# Patient Record
Sex: FEMALE | Race: WHITE | NOT HISPANIC OR LATINO | Employment: FULL TIME | URBAN - METROPOLITAN AREA
[De-identification: names, ages, dates, MRNs, and addresses within clinical notes are randomized per-mention and may not be internally consistent; named-entity substitution may affect disease eponyms.]

---

## 2017-01-17 ENCOUNTER — OFFICE VISIT (OUTPATIENT)
Dept: FAMILY MEDICINE | Facility: CLINIC | Age: 42
End: 2017-01-17
Payer: COMMERCIAL

## 2017-01-17 VITALS
HEIGHT: 71 IN | OXYGEN SATURATION: 96 % | HEART RATE: 88 BPM | DIASTOLIC BLOOD PRESSURE: 80 MMHG | BODY MASS INDEX: 34.3 KG/M2 | TEMPERATURE: 98.1 F | SYSTOLIC BLOOD PRESSURE: 112 MMHG | RESPIRATION RATE: 16 BRPM | WEIGHT: 245 LBS

## 2017-01-17 DIAGNOSIS — F30.9 BIPOLAR I DISORDER, SINGLE MANIC EPISODE (H): Primary | ICD-10-CM

## 2017-01-17 DIAGNOSIS — R63.5 ABNORMAL WEIGHT GAIN: ICD-10-CM

## 2017-01-17 DIAGNOSIS — R79.89 ELEVATED SERUM CREATININE: ICD-10-CM

## 2017-01-17 DIAGNOSIS — F33.1 MAJOR DEPRESSIVE DISORDER, RECURRENT EPISODE, MODERATE (H): ICD-10-CM

## 2017-01-17 LAB
ERYTHROCYTE [DISTWIDTH] IN BLOOD BY AUTOMATED COUNT: 13.3 % (ref 10–15)
HBA1C MFR BLD: 5.5 % (ref 4.3–6)
HCT VFR BLD AUTO: 40.7 % (ref 35–47)
HGB BLD-MCNC: 13 G/DL (ref 11.7–15.7)
MCH RBC QN AUTO: 30 PG (ref 26.5–33)
MCHC RBC AUTO-ENTMCNC: 31.9 G/DL (ref 31.5–36.5)
MCV RBC AUTO: 94 FL (ref 78–100)
PLATELET # BLD AUTO: 431 10E9/L (ref 150–450)
RBC # BLD AUTO: 4.34 10E12/L (ref 3.8–5.2)
WBC # BLD AUTO: 8.8 10E9/L (ref 4–11)

## 2017-01-17 PROCEDURE — 80053 COMPREHEN METABOLIC PANEL: CPT | Performed by: FAMILY MEDICINE

## 2017-01-17 PROCEDURE — 99214 OFFICE O/P EST MOD 30 MIN: CPT | Performed by: FAMILY MEDICINE

## 2017-01-17 PROCEDURE — 80061 LIPID PANEL: CPT | Performed by: FAMILY MEDICINE

## 2017-01-17 PROCEDURE — 85027 COMPLETE CBC AUTOMATED: CPT | Performed by: FAMILY MEDICINE

## 2017-01-17 PROCEDURE — 36415 COLL VENOUS BLD VENIPUNCTURE: CPT | Performed by: FAMILY MEDICINE

## 2017-01-17 PROCEDURE — 84443 ASSAY THYROID STIM HORMONE: CPT | Performed by: FAMILY MEDICINE

## 2017-01-17 PROCEDURE — 83036 HEMOGLOBIN GLYCOSYLATED A1C: CPT | Performed by: FAMILY MEDICINE

## 2017-01-17 RX ORDER — ZIPRASIDONE HYDROCHLORIDE 40 MG/1
CAPSULE ORAL
Qty: 30 CAPSULE | Refills: 0 | Status: SHIPPED | OUTPATIENT
Start: 2017-01-17

## 2017-01-17 RX ORDER — BUPROPION HYDROCHLORIDE 150 MG/1
300 TABLET ORAL EVERY MORNING
Qty: 60 TABLET | Refills: 0 | Status: SHIPPED | OUTPATIENT
Start: 2017-01-17 | End: 2020-02-02

## 2017-01-17 RX ORDER — VENLAFAXINE HYDROCHLORIDE 225 MG/1
TABLET, EXTENDED RELEASE ORAL
Qty: 30 TABLET | Refills: 0 | Status: SHIPPED | OUTPATIENT
Start: 2017-01-17 | End: 2020-02-02

## 2017-01-17 NOTE — PROGRESS NOTES
SUBJECTIVE:                                                    Jane Seaver is a 41 year old female who presents to clinic today for the following health issues:      Medication Followup of Effexor. BupropionAdriándon    Taking Medication as prescribed: yes    Side Effects:  None    Medication Helping Symptoms:  yes     Sx's stable on current meds- geodon 40mg/d, effexor-ER 225mg/d and wellbutrin XL 300mg/d.  On this regimen about a year.  Wt gain likely geodon...  Overall happy with how it works for her, though would be willing to discuss with psychiatry other options that would not be as tough with wt gain.      Would like to try diet/exercise to work on wt loss first as well.  Just started meal delivery ~1 1/2 months ago.  All vegetarian meals- tasty, some with cheese/carbs, but not much- heavy on vegetables.  Hello Fresh 3d/wk.  2 servings, so gets a left-over lunch.  Helpful with working on getting more balanced, healthy meals.  At baseline, skips meals, doesn't get enough vegetables.    Currently exercising about 1x/wk- volleyball 1x/wk.  Would like to join another team, and do more walking- does more in the summer.  Rec 7-min work-out.    Missed last psychiatry appt as holiday day off changed through work.  Now has appt with Psychiatry- Dr. Annette Naranjo on 1/31/17.  New to her, but recommended by therapist/chain.  Confident she'll be able to make that appt, and just needs 30-day med rx's now.      Patient Active Problem List   Diagnosis     Bipolar I disorder, single manic episode (H)     Severe cervical dysplasia     Major depressive disorder, recurrent episode, moderate (H)     CARDIOVASCULAR SCREENING; LDL GOAL LESS THAN 160     Insomnia     Past Surgical History   Procedure Laterality Date     Cl aff surgical pathology  2006     HANY III     Leep tx, cervical  2006     cone bx removal       Social History   Substance Use Topics     Smoking status: Former Smoker -- 1 years     Quit date: 04/15/2014      Smokeless tobacco: Never Used     Alcohol Use: 0.0 oz/week     0 Standard drinks or equivalent per week      Comment: 1 glass of wine per month     Family History   Problem Relation Age of Onset     C.A.D. Maternal Grandfather 75     Lipids Maternal Grandfather      Hypertension Maternal Grandmother      CEREBROVASCULAR DISEASE Maternal Grandmother      Depression Maternal Grandmother      Lipids Maternal Grandmother      CANCER Father      Lymphoma--leukemia  d. 52     Lipids Father      Allergies Father      GASTROINTESTINAL DISEASE Father      crohns     Gynecology Mother      fibroids, hysterectomy at 48     Lipids Mother      Depression Mother      Depression Paternal Grandmother      Schizophrenia     Other - See Comments Paternal Grandmother      MS     Depression Paternal Grandfather      GASTROINTESTINAL DISEASE Paternal Grandfather      CANCER Paternal Grandfather      throat/esophageal         Current Outpatient Prescriptions   Medication Sig Dispense Refill     ziprasidone (GEODON) 40 MG capsule TAKE 1 CAPSULE(40 MG) BY MOUTH DAILY 30 capsule 0     venlafaxine (EFFEXOR-ER) 225 MG TB24 24 hr tablet TAKE 1 TABLET(225 MG) BY MOUTH DAILY WITH BREAKFAST 30 tablet 0     buPROPion (WELLBUTRIN XL) 150 MG 24 hr tablet Take 2 tablets (300 mg) by mouth every morning 60 tablet 0     MULTIVITAMIN TABS   OR 1 TABLET DAILY       [DISCONTINUED] ziprasidone (GEODON) 40 MG capsule TAKE 1 CAPSULE(40 MG) BY MOUTH DAILY 30 capsule 0     [DISCONTINUED] venlafaxine (EFFEXOR-ER) 225 MG TB24 24 hr tablet TAKE 1 TABLET(225 MG) BY MOUTH DAILY WITH BREAKFAST 30 tablet 0     [DISCONTINUED] buPROPion (WELLBUTRIN XL) 150 MG 24 hr tablet Take 2 tablets (300 mg) by mouth every morning 180 tablet 0     CALCIUM 600 OR 2 TABLETS DAILY       Allergies   Allergen Reactions     Penicillins Hives     Recent Labs   Lab Test  03/20/12   1444  10/14/10   1451  11/04/08   1016   LDL  150*  157*  154*   HDL  48*  47*  54   TRIG   --    --   97  "  TSH   --   1.11   --       BP Readings from Last 3 Encounters:   01/17/17 112/80   11/23/16 126/87   08/19/16 116/80    Wt Readings from Last 3 Encounters:   01/17/17 245 lb (111.131 kg)   11/23/16 246 lb 4.8 oz (111.721 kg)   08/19/16 244 lb (110.678 kg)           Labs reviewed in EPIC  Problem list, Medication list, Allergies, and Medical/Social/Surgical histories reviewed in [facility] and updated as appropriate.    ROS:  Constitutional, HEENT, cardiovascular, pulmonary, gi and gu systems are negative, except as otherwise noted.    OBJECTIVE:                                                    /80 mmHg  Pulse 88  Temp(Src) 98.1  F (36.7  C) (Oral)  Resp 16  Ht 5' 11.25\" (1.81 m)  Wt 245 lb (111.131 kg)  BMI 33.92 kg/m2  SpO2 96%  LMP 01/03/2017  Breastfeeding? No  Body mass index is 33.92 kg/(m^2).   GEN: pleasant, alert, oriented, nad   Psych: full range affect, normal speech and grooming, judgement and insight intact        ASSESSMENT/PLAN:                                                    Depression/Bipolar; recurrent episode-- Partial remission   Associated with the following complications:    Weight gain   Plan:  Labs:  Thyroid, A1C, CMP, lipids and CBC  The following changes are made - diet/exercise discussed - 7-min w/u, cont hello fresh.  Referrals/Other:  Psychiatry f/u appt set up for 1/31/17.      ICD-10-CM    1. Bipolar I disorder, single manic episode (H) F30.9 ziprasidone (GEODON) 40 MG capsule     venlafaxine (EFFEXOR-ER) 225 MG TB24 24 hr tablet     buPROPion (WELLBUTRIN XL) 150 MG 24 hr tablet     CBC with platelets     Lipid Profile with reflex to direct LDL   2. Major depressive disorder, recurrent episode, moderate (H) F33.1 ziprasidone (GEODON) 40 MG capsule     venlafaxine (EFFEXOR-ER) 225 MG TB24 24 hr tablet     buPROPion (WELLBUTRIN XL) 150 MG 24 hr tablet     CBC with platelets   3. Abnormal weight gain R63.5 Hemoglobin A1c     Comprehensive metabolic panel (BMP + Alb, Alk " Phos, ALT, AST, Total. Bili, TP)     Lipid Profile with reflex to direct LDL     TSH with free T4 reflex         Sheree Hampton MD  [facility]

## 2017-01-17 NOTE — MR AVS SNAPSHOT
"              After Visit Summary   1/17/2017    Jane Seaver    MRN: [id number]           Patient Information     Date Of Birth          1975        Visit Information        Provider Department      1/17/2017 10:00 AM Sheree Hampton MD [facility]        Today's Diagnoses     Bipolar I disorder, single manic episode (H)    -  1     Major depressive disorder, recurrent episode, moderate (H)         Abnormal weight gain            Follow-ups after your visit        Who to contact     If you have questions or need follow up information about today's clinic visit or your schedule please contact [facility] directly at [phone].  Normal or non-critical lab and imaging results will be communicated to you by MyChart, letter or phone within 4 business days after the clinic has received the results. If you do not hear from us within 7 days, please contact the clinic through Swoopohart or phone. If you have a critical or abnormal lab result, we will notify you by phone as soon as possible.  Submit refill requests through THE [employer] App or call your pharmacy and they will forward the refill request to us. Please allow 3 business days for your refill to be completed.          Additional Information About Your Visit        MyChart Information     [employer] App gives you secure access to your electronic health record. If you see a primary care provider, you can also send messages to your care team and make appointments. If you have questions, please call your primary care clinic.  If you do not have a primary care provider, please call [phone] and they will assist you.        Care EveryWhere ID     This is your Care EveryWhere ID. This could be used by other organizations to access your [place] medical records  [id number]        Your Vitals Were     Pulse Temperature Respirations    88 98.1  F (36.7  C) (Oral) 16    Height BMI (Body Mass Index) Pulse Oximetry    5' 11.25\" (1.81 m) 33.92 kg/m2 96% "    Last Period Breastfeeding?       01/03/2017 No        Blood Pressure from Last 3 Encounters:   01/17/17 112/80   11/23/16 126/87   08/19/16 116/80    Weight from Last 3 Encounters:   01/17/17 245 lb (111.131 kg)   11/23/16 246 lb 4.8 oz (111.721 kg)   08/19/16 244 lb (110.678 kg)              We Performed the Following     CBC with platelets     Comprehensive metabolic panel (BMP + Alb, Alk Phos, ALT, AST, Total. Bili, TP)     Hemoglobin A1c     Lipid Profile with reflex to direct LDL          Today's Medication Changes          These changes are accurate as of: 1/17/17 10:32 AM.  If you have any questions, ask your nurse or doctor.               These medicines have changed or have updated prescriptions.        Dose/Directions    venlafaxine 225 MG Tb24 24 hr tablet   Commonly known as:  EFFEXOR-ER   This may have changed:  See the new instructions.   Used for:  Bipolar I disorder, single manic episode (H), Major depressive disorder, recurrent episode, moderate (H)   Changed by:  Sheree Hampton MD        TAKE 1 TABLET(225 MG) BY MOUTH DAILY WITH BREAKFAST   Quantity:  30 tablet   Refills:  0       ziprasidone 40 MG capsule   Commonly known as:  GEODON   This may have changed:  See the new instructions.   Used for:  Bipolar I disorder, single manic episode (H), Major depressive disorder, recurrent episode, moderate (H)   Changed by:  Sheree Hampton MD        TAKE 1 CAPSULE(40 MG) BY MOUTH DAILY   Quantity:  30 capsule   Refills:  0            Where to get your medicines      These medications were sent to [facility] [address] [place] [address] AT [address] [place] [id number]     Phone:  [phone]    - buPROPion 150 MG 24 hr tablet  - venlafaxine 225 MG Tb24 24 hr tablet  - ziprasidone 40 MG capsule             Primary Care Provider Office Phone # Fax #    Sheree Hampton [phone]       Anthony  [facility] [address] [facility]  [address]  [facility] [address]        Thank you!     Thank you for choosing [facility]  for your care. Our goal is always to provide you with excellent care. Hearing back from our patients is one way we can continue to improve our services. Please take a few minutes to complete the written survey that you may receive in the mail after your visit with us. Thank you!             Your Updated Medication List - Protect others around you: Learn how to safely use, store and throw away your medicines at www.disposemymeds.org.          This list is accurate as of: 1/17/17 10:32 AM.  Always use your most recent med list.                   Brand Name Dispense Instructions for use    buPROPion 150 MG 24 hr tablet    WELLBUTRIN XL    60 tablet    Take 2 tablets (300 mg) by mouth every morning       CALCIUM 600 PO      2 TABLETS DAILY       MULTIVITAMIN TABS   OR      1 TABLET DAILY       venlafaxine 225 MG Tb24 24 hr tablet    EFFEXOR-ER    30 tablet    TAKE 1 TABLET(225 MG) BY MOUTH DAILY WITH BREAKFAST       ziprasidone 40 MG capsule    GEODON    30 capsule    TAKE 1 CAPSULE(40 MG) BY MOUTH DAILY

## 2017-01-17 NOTE — LETTER
[facility]  [address]  [facility] [address]  [phone]        February 13, 2018    Jane Seaver  [address]  [facility] [address]              Dear Jane Seaver    This is to remind you that your non fasting labs are due.    You may call our office at [phone] to schedule an appointment.    Please disregard this notice if you have already had your labs drawn or made an appointment.        Sincerely,        Sheree Hampton MD

## 2017-01-17 NOTE — NURSING NOTE
"Chief Complaint   Patient presents with     Recheck Medication     Wellbutrin, Bupropion, Geodon       Initial /80 mmHg  Pulse 88  Temp(Src) 98.1  F (36.7  C) (Oral)  Resp 16  Ht 5' 11.25\" (1.81 m)  Wt 245 lb (111.131 kg)  BMI 33.92 kg/m2  SpO2 96%  LMP 01/03/2017  Breastfeeding? No Estimated body mass index is 33.92 kg/(m^2) as calculated from the following:    Height as of this encounter: 5' 11.25\" (1.81 m).    Weight as of this encounter: 245 lb (111.131 kg).  BP completed using cuff size: large  "

## 2017-01-18 LAB
ALBUMIN SERPL-MCNC: 3.5 G/DL (ref 3.4–5)
ALP SERPL-CCNC: 96 U/L (ref 40–150)
ALT SERPL W P-5'-P-CCNC: 27 U/L (ref 0–50)
ANION GAP SERPL CALCULATED.3IONS-SCNC: 7 MMOL/L (ref 3–14)
AST SERPL W P-5'-P-CCNC: 13 U/L (ref 0–45)
BILIRUB SERPL-MCNC: 0.4 MG/DL (ref 0.2–1.3)
BUN SERPL-MCNC: 9 MG/DL (ref 7–30)
CALCIUM SERPL-MCNC: 9 MG/DL (ref 8.5–10.1)
CHLORIDE SERPL-SCNC: 106 MMOL/L (ref 94–109)
CHOLEST SERPL-MCNC: 238 MG/DL
CO2 SERPL-SCNC: 27 MMOL/L (ref 20–32)
CREAT SERPL-MCNC: 1.13 MG/DL (ref 0.52–1.04)
GFR SERPL CREATININE-BSD FRML MDRD: 53 ML/MIN/1.7M2
GLUCOSE SERPL-MCNC: 81 MG/DL (ref 70–99)
HDLC SERPL-MCNC: 50 MG/DL
LDLC SERPL CALC-MCNC: 163 MG/DL
NONHDLC SERPL-MCNC: 188 MG/DL
POTASSIUM SERPL-SCNC: 4 MMOL/L (ref 3.4–5.3)
PROT SERPL-MCNC: 7.6 G/DL (ref 6.8–8.8)
SODIUM SERPL-SCNC: 140 MMOL/L (ref 133–144)
TRIGL SERPL-MCNC: 127 MG/DL
TSH SERPL DL<=0.005 MIU/L-ACNC: 2.21 MU/L (ref 0.4–4)

## 2017-01-18 ASSESSMENT — PATIENT HEALTH QUESTIONNAIRE - PHQ9: SUM OF ALL RESPONSES TO PHQ QUESTIONS 1-9: 7

## 2017-02-06 NOTE — PROGRESS NOTES
Quick Note:    Please send letter below with copy of results (she will likely not get this msg via [employer]).  Thanks! CW    Here are your lab results from your recent visit...  -Your TSH (thyroid stimulating hormone, which is elevated in hypothyroidism, and lowered in hyperthyroidism) is still in the normal range.  -Your CBC labs (which includes blood labs looking for signs of infection or anemia) looks normal as well.  -Your hemoglobin A1C (the three month average of your glucose levels) also looks good at 5.5 (prediabetes range is from 5.7-6.4, diabetes range is >6.4).    -Your cholesterol panel looks abnormal but stable with a high LDL at 163 (the bad cholesterol), but a slightly improved HDL at 50 (the good cholesterol). I would continue your diet/exercise efforts as much as you can, knowing that the psychiatric medications make it hard with weight loss efforts. At this point, I wouldn't start any cholesterol medication. Let me know if you'd like to talk about this in further depth, or if you'd like to meet with a nutritionist/dietician (though it's worth checking with your insurance company to see if would be covered).    -Your CMP (which includes electrolyte levels, blood sugar levels, and kidney and liver function tests) is also one to point out...  Your creatinine is a bit high, and your GFR is lower than it's been. Both are kidney function tests which show some worsening, and it would be good to recheck these labs to see if it is now back to normal, or if this is persistent. I'll put in an order for a future lab, and you can make a lab-only appointment to have it rechecked. I would do this in the next month if you can.      Please let me know if you have any questions.  Best,   Clarence Hampton MD  ______

## 2018-01-16 LAB — PHQ9 SCORE: 6

## 2018-04-23 ENCOUNTER — TRANSFERRED RECORDS (OUTPATIENT)
Dept: HEALTH INFORMATION MANAGEMENT | Facility: CLINIC | Age: 43
End: 2018-04-23

## 2018-11-08 ENCOUNTER — OFFICE VISIT (OUTPATIENT)
Dept: FAMILY MEDICINE | Facility: CLINIC | Age: 43
End: 2018-11-08
Payer: COMMERCIAL

## 2018-11-08 VITALS
TEMPERATURE: 97.8 F | DIASTOLIC BLOOD PRESSURE: 78 MMHG | RESPIRATION RATE: 16 BRPM | WEIGHT: 250 LBS | HEIGHT: 71 IN | SYSTOLIC BLOOD PRESSURE: 124 MMHG | OXYGEN SATURATION: 100 % | HEART RATE: 94 BPM | BODY MASS INDEX: 35 KG/M2

## 2018-11-08 DIAGNOSIS — Z23 NEED FOR PROPHYLACTIC VACCINATION AND INOCULATION AGAINST INFLUENZA: ICD-10-CM

## 2018-11-08 DIAGNOSIS — Z00.01 ENCOUNTER FOR ROUTINE ADULT MEDICAL EXAM WITH ABNORMAL FINDINGS: Primary | ICD-10-CM

## 2018-11-08 DIAGNOSIS — F30.9 BIPOLAR I DISORDER, SINGLE MANIC EPISODE (H): ICD-10-CM

## 2018-11-08 DIAGNOSIS — F33.1 MAJOR DEPRESSIVE DISORDER, RECURRENT EPISODE, MODERATE (H): ICD-10-CM

## 2018-11-08 DIAGNOSIS — Z12.39 SCREENING FOR MALIGNANT NEOPLASM OF BREAST: ICD-10-CM

## 2018-11-08 LAB
BASOPHILS # BLD AUTO: 0 10E9/L (ref 0–0.2)
BASOPHILS NFR BLD AUTO: 0.3 %
DIFFERENTIAL METHOD BLD: ABNORMAL
EOSINOPHIL # BLD AUTO: 0.1 10E9/L (ref 0–0.7)
EOSINOPHIL NFR BLD AUTO: 1.5 %
ERYTHROCYTE [DISTWIDTH] IN BLOOD BY AUTOMATED COUNT: 12.9 % (ref 10–15)
HBA1C MFR BLD: 5.4 % (ref 0–5.6)
HCT VFR BLD AUTO: 41.5 % (ref 35–47)
HGB BLD-MCNC: 13.6 G/DL (ref 11.7–15.7)
LYMPHOCYTES # BLD AUTO: 2 10E9/L (ref 0.8–5.3)
LYMPHOCYTES NFR BLD AUTO: 28.3 %
MCH RBC QN AUTO: 30.5 PG (ref 26.5–33)
MCHC RBC AUTO-ENTMCNC: 32.8 G/DL (ref 31.5–36.5)
MCV RBC AUTO: 93 FL (ref 78–100)
MONOCYTES # BLD AUTO: 0.5 10E9/L (ref 0–1.3)
MONOCYTES NFR BLD AUTO: 6.6 %
NEUTROPHILS # BLD AUTO: 4.5 10E9/L (ref 1.6–8.3)
NEUTROPHILS NFR BLD AUTO: 63.3 %
PLATELET # BLD AUTO: 461 10E9/L (ref 150–450)
RBC # BLD AUTO: 4.46 10E12/L (ref 3.8–5.2)
WBC # BLD AUTO: 7.2 10E9/L (ref 4–11)

## 2018-11-08 PROCEDURE — 93000 ELECTROCARDIOGRAM COMPLETE: CPT | Performed by: PHYSICIAN ASSISTANT

## 2018-11-08 PROCEDURE — 80061 LIPID PANEL: CPT | Performed by: PHYSICIAN ASSISTANT

## 2018-11-08 PROCEDURE — 83036 HEMOGLOBIN GLYCOSYLATED A1C: CPT | Performed by: PHYSICIAN ASSISTANT

## 2018-11-08 PROCEDURE — 90471 IMMUNIZATION ADMIN: CPT | Performed by: PHYSICIAN ASSISTANT

## 2018-11-08 PROCEDURE — 80053 COMPREHEN METABOLIC PANEL: CPT | Performed by: PHYSICIAN ASSISTANT

## 2018-11-08 PROCEDURE — 90686 IIV4 VACC NO PRSV 0.5 ML IM: CPT | Performed by: PHYSICIAN ASSISTANT

## 2018-11-08 PROCEDURE — 85025 COMPLETE CBC W/AUTO DIFF WBC: CPT | Performed by: PHYSICIAN ASSISTANT

## 2018-11-08 PROCEDURE — 36415 COLL VENOUS BLD VENIPUNCTURE: CPT | Performed by: PHYSICIAN ASSISTANT

## 2018-11-08 PROCEDURE — 99396 PREV VISIT EST AGE 40-64: CPT | Mod: 25 | Performed by: PHYSICIAN ASSISTANT

## 2018-11-08 RX ORDER — BUPROPION HYDROCHLORIDE 300 MG/1
300 TABLET ORAL DAILY
Refills: 5 | COMMUNITY
Start: 2018-11-03

## 2018-11-08 RX ORDER — DESVENLAFAXINE 100 MG/1
1 TABLET, EXTENDED RELEASE ORAL EVERY MORNING
Refills: 5 | COMMUNITY
Start: 2018-11-01

## 2018-11-08 ASSESSMENT — ENCOUNTER SYMPTOMS
PSYCHIATRIC NEGATIVE: 1
ENDOCRINE NEGATIVE: 1
MUSCULOSKELETAL NEGATIVE: 1
NEUROLOGICAL NEGATIVE: 1
CONSTITUTIONAL NEGATIVE: 1
GASTROINTESTINAL NEGATIVE: 1
RESPIRATORY NEGATIVE: 1
EYES NEGATIVE: 1
CARDIOVASCULAR NEGATIVE: 1

## 2018-11-08 ASSESSMENT — PATIENT HEALTH QUESTIONNAIRE - PHQ9
10. IF YOU CHECKED OFF ANY PROBLEMS, HOW DIFFICULT HAVE THESE PROBLEMS MADE IT FOR YOU TO DO YOUR WORK, TAKE CARE OF THINGS AT HOME, OR GET ALONG WITH OTHER PEOPLE: SOMEWHAT DIFFICULT
SUM OF ALL RESPONSES TO PHQ QUESTIONS 1-9: 3
SUM OF ALL RESPONSES TO PHQ QUESTIONS 1-9: 3

## 2018-11-08 NOTE — PROGRESS NOTES
Answers for HPI/ROS submitted by the patient on 11/8/2018   Annual Exam:  Frequency of exercise:: 1 day/week  Getting at least 3 servings of Calcium per day:: Yes  Diet:: Regular (no restrictions)  Taking medications regularly:: Yes  Medication side effects:: None, No muscle aches, No significant flushing  Bi-annual eye exam:: NO  Dental care twice a year:: Yes  Sleep apnea or symptoms of sleep apnea:: None  Additional concerns today:: Yes  PHQ-2 Score: 1  Duration of exercise:: 30-45 minutes  If you checked off any problems, how difficult have these problems made it for you to do your work, take care of things at home, or get along with other people?: Somewhat difficult  PHQ9 TOTAL SCORE: 3      Injectable Influenza Immunization Documentation    1.  Is the person to be vaccinated sick today?   No    2. Does the person to be vaccinated have an allergy to a component   of the vaccine?   No  Egg Allergy Algorithm Link    3. Has the person to be vaccinated ever had a serious reaction   to influenza vaccine in the past?   No    4. Has the person to be vaccinated ever had Guillain-Barré syndrome?   No    Form completed by Anupama Bingham CMA

## 2018-11-08 NOTE — PROGRESS NOTES
SUBJECTIVE:   CC: Jane Seaver is an 43 year old woman who presents for preventive health visit.     Physical   Annual:     Getting at least 3 servings of Calcium per day:  Yes    Bi-annual eye exam:  NO    Dental care twice a year:  Yes    Sleep apnea or symptoms of sleep apnea:  None    Diet:  Regular (no restrictions)    Frequency of exercise:  1 day/week    Duration of exercise:  30-45 minutes    Taking medications regularly:  Yes    Medication side effects:  None, No muscle aches and No significant flushing    Additional concerns today:  Yes      Today's PHQ-2 Score:   PHQ-2 ( 1999 Pfizer) 11/8/2018   Q1: Little interest or pleasure in doing things 0   Q2: Feeling down, depressed or hopeless 1   PHQ-2 Score 1   Q1: Little interest or pleasure in doing things Not at all   Q2: Feeling down, depressed or hopeless Several days   PHQ-2 Score 1       Abuse: Current or Past(Physical, Sexual or Emotional)- No  Do you feel safe in your environment - Yes    Social History   Substance Use Topics     Smoking status: Former Smoker     Years: 1.00     Quit date: 4/15/2014     Smokeless tobacco: Never Used     Alcohol use 0.0 oz/week     0 Standard drinks or equivalent per week      Comment: 1 glass of wine per month     Alcohol Use 11/8/2018   If you drink alcohol do you typically have greater than 3 drinks per day OR greater than 7 drinks per week? Not Applicable   No flowsheet data found.    Reviewed orders with patient.  Reviewed health maintenance and updated orders accordingly - Yes  BP Readings from Last 3 Encounters:   11/08/18 124/78   01/17/17 112/80   11/23/16 126/87    Wt Readings from Last 3 Encounters:   11/08/18 250 lb (113.4 kg)   01/17/17 245 lb (111.1 kg)   11/23/16 246 lb 4.8 oz (111.7 kg)                  Patient Active Problem List   Diagnosis     Bipolar I disorder, single manic episode (H)     Severe cervical dysplasia     Major depressive disorder, recurrent episode, moderate (H)     CARDIOVASCULAR  SCREENING; LDL GOAL LESS THAN 160     Insomnia     Past Surgical History:   Procedure Laterality Date     CL AFF SURGICAL PATHOLOGY  2006    HANY III     LEEP TX, CERVICAL  2006    cone bx removal       Social History   Substance Use Topics     Smoking status: Former Smoker     Years: 1.00     Quit date: 4/15/2014     Smokeless tobacco: Never Used     Alcohol use 0.0 oz/week     0 Standard drinks or equivalent per week      Comment: 1 glass of wine per month     Family History   Problem Relation Age of Onset     C.A.D. Maternal Grandfather 75     Lipids Maternal Grandfather      Hypertension Maternal Grandmother      Cerebrovascular Disease Maternal Grandmother      Depression Maternal Grandmother      Lipids Maternal Grandmother      Cancer Father      Lymphoma--leukemia  d. 52     Lipids Father      Allergies Father      GASTROINTESTINAL DISEASE Father      crohns     Gynecology Mother      fibroids, hysterectomy at 48     Lipids Mother      Depression Mother      Depression Paternal Grandmother      Schizophrenia     Other - See Comments Paternal Grandmother      MS     Depression Paternal Grandfather      GASTROINTESTINAL DISEASE Paternal Grandfather      Cancer Paternal Grandfather      throat/esophageal         Current Outpatient Prescriptions   Medication Sig Dispense Refill     buPROPion (WELLBUTRIN XL) 300 MG 24 hr tablet Take 300 mg by mouth daily  5     desvenlafaxine succinate (PRISTIQ) 100 MG 24 hr tablet Take 1 tablet by mouth every morning  5     ziprasidone (GEODON) 40 MG capsule TAKE 1 CAPSULE(40 MG) BY MOUTH DAILY 30 capsule 0     buPROPion (WELLBUTRIN XL) 150 MG 24 hr tablet Take 2 tablets (300 mg) by mouth every morning (Patient not taking: Reported on 11/8/2018) 60 tablet 0     venlafaxine (EFFEXOR-ER) 225 MG TB24 24 hr tablet TAKE 1 TABLET(225 MG) BY MOUTH DAILY WITH BREAKFAST (Patient not taking: Reported on 11/8/2018) 30 tablet 0     Allergies   Allergen Reactions     Penicillins Hives  "      Patient under age 50, mutual decision reflected in health maintenance.      Pertinent mammograms are reviewed under the imaging tab.  History of abnormal Pap smear: YES - updated in Problem List and Health Maintenance accordingly  PAP / HPV Latest Ref Rng & Units 11/23/2016 7/15/2014 3/20/2012   PAP - NIL NIL NIL   HPV 16 DNA NEG Negative - -   HPV 18 DNA NEG Negative - -   OTHER HR HPV NEG Negative - -     Reviewed and updated as needed this visit by clinical staff  Tobacco  Allergies  Meds  Med Hx  Surg Hx  Fam Hx  Soc Hx        Reviewed and updated as needed this visit by Provider            Review of Systems   Constitutional: Negative.    HENT: Negative.    Eyes: Negative.    Respiratory: Negative.    Cardiovascular: Negative.    Gastrointestinal: Negative.    Endocrine: Negative.    Breasts:  negative.    Genitourinary: Negative.    Musculoskeletal: Negative.    Neurological: Negative.    Psychiatric/Behavioral: Negative.           OBJECTIVE:   /78 (Cuff Size: Adult Large)  Pulse 94  Temp 97.8  F (36.6  C) (Tympanic)  Resp 16  Ht 5' 11.25\" (1.81 m)  Wt 250 lb (113.4 kg)  LMP 10/29/2018  SpO2 100%  BMI 34.62 kg/m2  Physical Exam   Constitutional: She is oriented to person, place, and time. She appears well-developed and well-nourished. No distress.   HENT:   Right Ear: Tympanic membrane and external ear normal.   Left Ear: Tympanic membrane and external ear normal.   Nose: Nose normal.   Mouth/Throat: Oropharynx is clear and moist. No oropharyngeal exudate.   Eyes: Conjunctivae are normal. Pupils are equal, round, and reactive to light. Right eye exhibits no discharge. Left eye exhibits no discharge.   Neck: Neck supple. No tracheal deviation present. No thyromegaly present.   Cardiovascular: Normal rate, regular rhythm, S1 normal, S2 normal, normal heart sounds and normal pulses.  Exam reveals no S3, no S4 and no friction rub.    No murmur heard.  Pulmonary/Chest: Effort normal and " "breath sounds normal. No respiratory distress. She has no wheezes. She has no rales. Right breast exhibits no mass, no nipple discharge and no tenderness. Left breast exhibits no mass, no nipple discharge and no tenderness.   Abdominal: Soft. She exhibits no mass. There is no hepatosplenomegaly.   Musculoskeletal: Normal range of motion. She exhibits no edema.   Lymphadenopathy:     She has no cervical adenopathy.   Neurological: She is alert and oriented to person, place, and time. She has normal strength and normal reflexes. She exhibits normal muscle tone.   Skin: Skin is warm and dry. No rash noted.   Psychiatric: She has a normal mood and affect. Judgment and thought content normal. Cognition and memory are normal.         No results found for this or any previous visit (from the past 24 hour(s)).        ASSESSMENT/PLAN:       ICD-10-CM    1. Encounter for routine adult medical exam with abnormal findings Z00.01 EKG 12-lead complete w/read - Clinics     CBC with platelets differential     Comprehensive metabolic panel     Lipid panel reflex to direct LDL Fasting     Hemoglobin A1c     OPTOMETRY REFERRAL   2. Need for prophylactic vaccination and inoculation against influenza Z23    3. Bipolar I disorder, single manic episode (H) F30.9 EKG 12-lead complete w/read - Clinics   4. Screening for malignant neoplasm of breast Z12.31 MA Screening Digital Bilateral   5. Major depressive disorder, recurrent episode, moderate (H) F33.1               COUNSELING:  Reviewed preventive health counseling, as reflected in patient instructions  Special attention given to:        Regular exercise       Healthy diet/nutrition       Vision screening       Immunizations    Vaccinated for: Influenza          BP Readings from Last 1 Encounters:   11/08/18 124/78     Estimated body mass index is 34.62 kg/(m^2) as calculated from the following:    Height as of this encounter: 5' 11.25\" (1.81 m).    Weight as of this encounter: 250 lb " (113.4 kg).      Weight management plan: Discussed healthy diet and exercise guidelines and patient will follow up in 12 months in clinic to re-evaluate.     reports that she quit smoking about 4 years ago. She quit after 1.00 year of use. She has never used smokeless tobacco.      Counseling Resources:  ATP IV Guidelines  Pooled Cohorts Equation Calculator  Breast Cancer Risk Calculator  FRAX Risk Assessment  ICSI Preventive Guidelines  Dietary Guidelines for Americans, 2010  [employer] MyPlate  ASA Prophylaxis  Lung CA Screening    Rena Silva PA-C  [facility]      Answers for HPI/ROS submitted by the patient on 11/8/2018   PHQ-2 Score: 1  If you checked off any problems, how difficult have these problems made it for you to do your work, take care of things at home, or get along with other people?: Somewhat difficult  PHQ9 TOTAL SCORE: 3

## 2018-11-08 NOTE — PATIENT INSTRUCTIONS
Preventive Health Recommendations  Female Ages 40 to 49    Yearly exam:     See your health care provider every year in order to  1. Review health changes.   2. Discuss preventive care.    3. Review your medicines if your doctor prescribed any.      Get a Pap test every three years (unless you have an abnormal result and your provider advises testing more often).      If you get Pap tests with HPV test, you only need to test every 5 years, unless you have an abnormal result. You do not need a Pap test if your uterus was removed (hysterectomy) and you have not had cancer.      You should be tested each year for STDs (sexually transmitted diseases), if you're at risk.     Ask your doctor if you should have a mammogram.      Have a colonoscopy (test for colon cancer) if someone in your family has had colon cancer or polyps before age 50.       Have a cholesterol test every 5 years.       Have a diabetes test (fasting glucose) after age 45. If you are at risk for diabetes, you should have this test every 3 years.    Shots: Get a flu shot each year. Get a tetanus shot every 10 years.     Nutrition:     Eat at least 5 servings of fruits and vegetables each day.    Eat whole-grain bread, whole-wheat pasta and brown rice instead of white grains and rice.    Get adequate Calcium and Vitamin D.      Lifestyle    Exercise at least 150 minutes a week (an average of 30 minutes a day, 5 days a week). This will help you control your weight and prevent disease.    Limit alcohol to one drink per day.    No smoking.     Wear sunscreen to prevent skin cancer.    See your dentist every six months for an exam and cleaning.      Diet and exercise are important!    - Improving your diet and increasing exercise has been shown to improve your overall health.  It is THE BEST preventive health measure.  It is also beneficial to mental health.   - This includes lowering hemoglobin A1C (your diabetes lab test), lowering your blood pressure,  and lowering your cholesterol.  Losing weight often happens with a change in diet and exercise, but even if your weight stays the same - you are still getting benefits from diet and exercise!   - I recommend starting slow - like making a small change in diet or increasing exercise by one time a week to start.    - Then follow up closely to check in.   - A  or dietician are both great resources if that is available to you.     You are due for a mammogram, which is a study to screen for breast cancer.  [place] has several Breast Centers and also a walk-in mammogram service (no appointment needed!) at [facility].  Below is the contact information.  Please complete the mammogram at your earliest convenience.  If you have any questions, call us at [facility] - [phone].    [facility]  [address]  [address]  [place] [address]  Appointments: [phone] or [phone]  Hours: M-F 7:00AM-5:00PM

## 2018-11-09 LAB
ALBUMIN SERPL-MCNC: 3.6 G/DL (ref 3.4–5)
ALP SERPL-CCNC: 85 U/L (ref 40–150)
ALT SERPL W P-5'-P-CCNC: 29 U/L (ref 0–50)
ANION GAP SERPL CALCULATED.3IONS-SCNC: 7 MMOL/L (ref 3–14)
AST SERPL W P-5'-P-CCNC: 18 U/L (ref 0–45)
BILIRUB SERPL-MCNC: 0.4 MG/DL (ref 0.2–1.3)
BUN SERPL-MCNC: 11 MG/DL (ref 7–30)
CALCIUM SERPL-MCNC: 9 MG/DL (ref 8.5–10.1)
CHLORIDE SERPL-SCNC: 104 MMOL/L (ref 94–109)
CHOLEST SERPL-MCNC: 212 MG/DL
CO2 SERPL-SCNC: 27 MMOL/L (ref 20–32)
CREAT SERPL-MCNC: 1.12 MG/DL (ref 0.52–1.04)
GFR SERPL CREATININE-BSD FRML MDRD: 53 ML/MIN/1.7M2
GLUCOSE SERPL-MCNC: 74 MG/DL (ref 70–99)
HDLC SERPL-MCNC: 44 MG/DL
LDLC SERPL CALC-MCNC: 137 MG/DL
NONHDLC SERPL-MCNC: 168 MG/DL
POTASSIUM SERPL-SCNC: 3.7 MMOL/L (ref 3.4–5.3)
PROT SERPL-MCNC: 7.8 G/DL (ref 6.8–8.8)
SODIUM SERPL-SCNC: 138 MMOL/L (ref 133–144)
TRIGL SERPL-MCNC: 157 MG/DL

## 2019-11-06 ENCOUNTER — HEALTH MAINTENANCE LETTER (OUTPATIENT)
Age: 44
End: 2019-11-06

## 2020-02-02 PROBLEM — N18.30 CKD (CHRONIC KIDNEY DISEASE) STAGE 3, GFR 30-59 ML/MIN (H): Status: ACTIVE | Noted: 2020-02-02

## 2020-02-03 ENCOUNTER — OFFICE VISIT (OUTPATIENT)
Dept: FAMILY MEDICINE | Facility: CLINIC | Age: 45
End: 2020-02-03
Payer: COMMERCIAL

## 2020-02-03 VITALS
DIASTOLIC BLOOD PRESSURE: 74 MMHG | HEIGHT: 71 IN | HEART RATE: 81 BPM | SYSTOLIC BLOOD PRESSURE: 110 MMHG | TEMPERATURE: 97.7 F | WEIGHT: 249 LBS | RESPIRATION RATE: 12 BRPM | BODY MASS INDEX: 34.86 KG/M2

## 2020-02-03 DIAGNOSIS — Z00.00 ROUTINE GENERAL MEDICAL EXAMINATION AT A HEALTH CARE FACILITY: ICD-10-CM

## 2020-02-03 DIAGNOSIS — F33.1 MAJOR DEPRESSIVE DISORDER, RECURRENT EPISODE, MODERATE (H): ICD-10-CM

## 2020-02-03 DIAGNOSIS — N18.30 CKD (CHRONIC KIDNEY DISEASE) STAGE 3, GFR 30-59 ML/MIN (H): ICD-10-CM

## 2020-02-03 DIAGNOSIS — Z23 NEED FOR INFLUENZA VACCINATION: ICD-10-CM

## 2020-02-03 DIAGNOSIS — Z00.00 ENCOUNTER FOR ROUTINE ADULT HEALTH EXAMINATION WITHOUT ABNORMAL FINDINGS: Primary | ICD-10-CM

## 2020-02-03 DIAGNOSIS — Z12.31 ENCOUNTER FOR SCREENING MAMMOGRAM FOR BREAST CANCER: ICD-10-CM

## 2020-02-03 DIAGNOSIS — Z12.4 SCREENING FOR CERVICAL CANCER: ICD-10-CM

## 2020-02-03 LAB
CHOLEST SERPL-MCNC: 235 MG/DL
CREAT SERPL-MCNC: 1.08 MG/DL (ref 0.52–1.04)
ERYTHROCYTE [DISTWIDTH] IN BLOOD BY AUTOMATED COUNT: 13.2 % (ref 10–15)
GFR SERPL CREATININE-BSD FRML MDRD: 63 ML/MIN/{1.73_M2}
GLUCOSE SERPL-MCNC: 89 MG/DL (ref 70–99)
HCT VFR BLD AUTO: 41.7 % (ref 35–47)
HDLC SERPL-MCNC: 51 MG/DL
HGB BLD-MCNC: 13.2 G/DL (ref 11.7–15.7)
LDLC SERPL CALC-MCNC: 161 MG/DL
MCH RBC QN AUTO: 29.8 PG (ref 26.5–33)
MCHC RBC AUTO-ENTMCNC: 31.7 G/DL (ref 31.5–36.5)
MCV RBC AUTO: 94 FL (ref 78–100)
NONHDLC SERPL-MCNC: 184 MG/DL
PLATELET # BLD AUTO: 482 10E9/L (ref 150–450)
RBC # BLD AUTO: 4.43 10E12/L (ref 3.8–5.2)
TRIGL SERPL-MCNC: 113 MG/DL
WBC # BLD AUTO: 7.2 10E9/L (ref 4–11)

## 2020-02-03 PROCEDURE — 85027 COMPLETE CBC AUTOMATED: CPT | Performed by: PHYSICIAN ASSISTANT

## 2020-02-03 PROCEDURE — 36415 COLL VENOUS BLD VENIPUNCTURE: CPT | Performed by: PHYSICIAN ASSISTANT

## 2020-02-03 PROCEDURE — 90686 IIV4 VACC NO PRSV 0.5 ML IM: CPT | Performed by: PHYSICIAN ASSISTANT

## 2020-02-03 PROCEDURE — G0145 SCR C/V CYTO,THINLAYER,RESCR: HCPCS | Performed by: PHYSICIAN ASSISTANT

## 2020-02-03 PROCEDURE — 99396 PREV VISIT EST AGE 40-64: CPT | Mod: 25 | Performed by: PHYSICIAN ASSISTANT

## 2020-02-03 PROCEDURE — 90471 IMMUNIZATION ADMIN: CPT | Performed by: PHYSICIAN ASSISTANT

## 2020-02-03 PROCEDURE — 82947 ASSAY GLUCOSE BLOOD QUANT: CPT | Performed by: PHYSICIAN ASSISTANT

## 2020-02-03 PROCEDURE — 80061 LIPID PANEL: CPT | Performed by: PHYSICIAN ASSISTANT

## 2020-02-03 PROCEDURE — 82565 ASSAY OF CREATININE: CPT | Performed by: PHYSICIAN ASSISTANT

## 2020-02-03 PROCEDURE — 87624 HPV HI-RISK TYP POOLED RSLT: CPT | Performed by: PHYSICIAN ASSISTANT

## 2020-02-03 ASSESSMENT — MIFFLIN-ST. JEOR: SCORE: 1885.59

## 2020-02-03 ASSESSMENT — ENCOUNTER SYMPTOMS
EYES NEGATIVE: 1
PSYCHIATRIC NEGATIVE: 1
GASTROINTESTINAL NEGATIVE: 1
RESPIRATORY NEGATIVE: 1
NEUROLOGICAL NEGATIVE: 1
MUSCULOSKELETAL NEGATIVE: 1
CONSTITUTIONAL NEGATIVE: 1
CARDIOVASCULAR NEGATIVE: 1

## 2020-02-03 NOTE — PATIENT INSTRUCTIONS
Preventive Health Recommendations  Female Ages 40 to 49    Yearly exam:     See your health care provider every year in order to  1. Review health changes.   2. Discuss preventive care.    3. Review your medicines if your doctor prescribed any.      Get a Pap test every three years (unless you have an abnormal result and your provider advises testing more often).      If you get Pap tests with HPV test, you only need to test every 5 years, unless you have an abnormal result. You do not need a Pap test if your uterus was removed (hysterectomy) and you have not had cancer.      You should be tested each year for STDs (sexually transmitted diseases), if you're at risk.     Ask your doctor if you should have a mammogram.      Have a colonoscopy (test for colon cancer) if someone in your family has had colon cancer or polyps before age 50.       Have a cholesterol test every 5 years.       Have a diabetes test (fasting glucose) after age 45. If you are at risk for diabetes, you should have this test every 3 years.    Shots: Get a flu shot each year. Get a tetanus shot every 10 years.     Nutrition:     Eat at least 5 servings of fruits and vegetables each day.    Eat whole-grain bread, whole-wheat pasta and brown rice instead of white grains and rice.    Get adequate Calcium and Vitamin D.      Lifestyle    Exercise at least 150 minutes a week (an average of 30 minutes a day, 5 days a week). This will help you control your weight and prevent disease.    Limit alcohol to one drink per day.    No smoking.     Wear sunscreen to prevent skin cancer.    See your dentist every six months for an exam and cleaning.      You are due for a mammogram, which is a study to screen for breast cancer.  [place] has several Breast Centers and also a walk-in mammogram service (no appointment needed!) at our [facility] location.  Below is the contact information.  Please complete the mammogram at your earliest convenience.  If  you have any questions, [facility] at [facility] - [phone].    [facility] walk-in mammogram services:  Address:   [address] [place] [address]  Hours: M/Tu/Th 7:00AM-7:00PM, W/F 7:00AM-5:00PM    [facility]  [address]  [address]  [place] [address]  Appointments: [phone] or [phone]  Hours: M-F 7:00AM-5:00PM    [facility]  [address] [facility]  [address]  [place] [address]  Appointments: [phone] or [phone]

## 2020-02-03 NOTE — PROGRESS NOTES
SUBJECTIVE:   CC: Jane Seaver is an 44 year old woman who presents for preventive health visit.     Healthy Habits:     Getting at least 3 servings of Calcium per day:  Yes    Bi-annual eye exam:  Yes    Dental care twice a year:  Yes    Sleep apnea or symptoms of sleep apnea:  Sleep apnea    Diet:  Vegetarian/vegan    Frequency of exercise:  1 day/week    Duration of exercise:  Less than 15 minutes    Taking medications regularly:  Yes    Medication side effects:  Not applicable, None, No muscle aches and No significant flushing    PHQ-2 Total Score: 2    Additional concerns today:  No      Today's PHQ-2 Score:   PHQ-2 (  [employer]) 2020   Q1: Little interest or pleasure in doing things 1   Q2: Feeling down, depressed or hopeless 1   PHQ-2 Score 2   Q1: Little interest or pleasure in doing things Several days   Q2: Feeling down, depressed or hopeless Several days   PHQ-2 Score 2     PHQ-9 SCORE 2016   PHQ-9 Total Score - - -   PHQ-9 Total Score MyChart - - 3 (Minimal depression)   PHQ-9 Total Score 7 7 3         Abuse: Current or Past(Physical, Sexual or Emotional)- No  Do you feel safe in your environment? Yes        Social History     Tobacco Use     Smoking status: Former Smoker     Years: 1.00     Last attempt to quit: 4/15/2014     Years since quittin.8     Smokeless tobacco: Never Used   Substance Use Topics     Alcohol use: Yes     Alcohol/week: 0.0 standard drinks     Comment: 1 glass of wine per month     If you drink alcohol do you typically have >3 drinks per day or >7 drinks per week? No    Reviewed orders with patient.  Reviewed health maintenance and updated orders accordingly - Yes  BP Readings from Last 3 Encounters:   20 110/74   18 124/78   17 112/80    Wt Readings from Last 3 Encounters:   20 112.9 kg (249 lb)   18 113.4 kg (250 lb)   17 111.1 kg (245 lb)                  Patient Active Problem List   Diagnosis     Bipolar  I disorder, single manic episode (H)     Severe cervical dysplasia     Major depressive disorder, recurrent episode, moderate (H)     CARDIOVASCULAR SCREENING; LDL GOAL LESS THAN 160     Insomnia     CKD (chronic kidney disease) stage 3, GFR 30-59 ml/min (H)     Past Surgical History:   Procedure Laterality Date     CL AFF SURGICAL PATHOLOGY  2006    HANY III     LEEP TX, CERVICAL  2006    cone bx removal       Social History     Tobacco Use     Smoking status: Former Smoker     Years: 1.00     Last attempt to quit: 4/15/2014     Years since quittin.8     Smokeless tobacco: Never Used   Substance Use Topics     Alcohol use: Yes     Alcohol/week: 0.0 standard drinks     Comment: 1 glass of wine per month     Family History   Problem Relation Age of Onset     C.A.D. Maternal Grandfather 75     Lipids Maternal Grandfather      Hypertension Maternal Grandmother      Cerebrovascular Disease Maternal Grandmother      Depression Maternal Grandmother      Lipids Maternal Grandmother      Cancer Father         Lymphoma--leukemia  d. 52     Lipids Father      Allergies Father      Gastrointestinal Disease Father         crohns     Gynecology Mother         fibroids, hysterectomy at 48     Lipids Mother      Depression Mother      Depression Paternal Grandmother         Schizophrenia     Other - See Comments Paternal Grandmother         MS     Depression Paternal Grandfather      Gastrointestinal Disease Paternal Grandfather      Cancer Paternal Grandfather         throat/esophageal         Current Outpatient Medications   Medication Sig Dispense Refill     buPROPion (WELLBUTRIN XL) 300 MG 24 hr tablet Take 300 mg by mouth daily  5     desvenlafaxine succinate (PRISTIQ) 100 MG 24 hr tablet Take 1 tablet by mouth every morning  5     ziprasidone (GEODON) 40 MG capsule TAKE 1 CAPSULE(40 MG) BY MOUTH DAILY 30 capsule 0     Allergies   Allergen Reactions     Penicillins Hives       Mammogram Screening: Patient under age 50,  "mutual decision reflected in health maintenance.      Pertinent mammograms are reviewed under the imaging tab.  History of abnormal Pap smear: YES - updated in Problem List and Health Maintenance accordingly  PAP / HPV Latest Ref Rng & Units 11/23/2016 7/15/2014 3/20/2012   PAP - NIL NIL NIL   HPV 16 DNA NEG Negative - -   HPV 18 DNA NEG Negative - -   OTHER HR HPV NEG Negative - -     Reviewed and updated as needed this visit by clinical staff  Tobacco  Meds  Med Hx  Surg Hx  Fam Hx  Soc Hx        Reviewed and updated as needed this visit by Provider            Review of Systems   Constitutional: Negative.    HENT: Negative.    Eyes: Negative.    Respiratory: Negative.    Cardiovascular: Negative.    Gastrointestinal: Negative.    Genitourinary: Negative.    Musculoskeletal: Negative.    Skin: Negative.    Neurological: Negative.    Psychiatric/Behavioral: Negative.           OBJECTIVE:   /74 (Cuff Size: Adult Large)   Pulse 81   Temp 97.7  F (36.5  C) (Tympanic)   Resp 12   Ht 1.803 m (5' 11\")   Wt 112.9 kg (249 lb)   LMP 01/22/2020   BMI 34.73 kg/m    Physical Exam  Constitutional:       General: She is not in acute distress.     Appearance: She is well-developed.   HENT:      Right Ear: Tympanic membrane and external ear normal.      Left Ear: Tympanic membrane and external ear normal.      Nose: Nose normal.      Mouth/Throat:      Pharynx: No oropharyngeal exudate.   Eyes:      General:         Right eye: No discharge.         Left eye: No discharge.      Conjunctiva/sclera: Conjunctivae normal.      Pupils: Pupils are equal, round, and reactive to light.   Neck:      Musculoskeletal: Neck supple.      Thyroid: No thyromegaly.      Trachea: No tracheal deviation.   Cardiovascular:      Rate and Rhythm: Normal rate and regular rhythm.      Pulses: Normal pulses.      Heart sounds: Normal heart sounds, S1 normal and S2 normal. No murmur. No friction rub. No S3 or S4 sounds.    Pulmonary:      " Effort: Pulmonary effort is normal. No respiratory distress.      Breath sounds: Normal breath sounds. No wheezing or rales.   Chest:      Breasts:         Right: No mass, nipple discharge or tenderness.         Left: No mass, nipple discharge or tenderness.   Abdominal:      Palpations: Abdomen is soft. There is no mass.      Tenderness: There is no abdominal tenderness.   Genitourinary:     Vagina: Normal.      Cervix: No cervical motion tenderness or discharge.   Musculoskeletal: Normal range of motion.   Lymphadenopathy:      Cervical: No cervical adenopathy.   Skin:     General: Skin is warm and dry.      Findings: No rash.   Neurological:      Mental Status: She is alert and oriented to person, place, and time.      Motor: No abnormal muscle tone.      Deep Tendon Reflexes: Reflexes are normal and symmetric.   Psychiatric:         Thought Content: Thought content normal.         Judgment: Judgment normal.           Diagnostic Test Results:  No results found for this or any previous visit (from the past 24 hour(s)).    ASSESSMENT/PLAN:       ICD-10-CM    1. Encounter for routine adult health examination without abnormal findings Z00.00 Lipid panel reflex to direct LDL Fasting     Glucose     Creatinine     CBC with platelets   2. Screening for cervical cancer Z12.4 Pap imaged thin layer screen with HPV - recommended age 30 - 65 years (select HPV order below)     HPV High Risk Types DNA Cervical   3. CKD (chronic kidney disease) stage 3, GFR 30-59 ml/min (H) N18.3 Creatinine   4. Routine general medical examination at a health care facility Z00.00 EKG 12-lead complete w/read - Clinics   5. Major depressive disorder, recurrent episode, moderate (H) F33.1 EKG 12-lead complete w/read - Clinics   6. Need for influenza vaccination Z23 INFLUENZA VACCINE IM > 6 MONTHS VALENT IIV4 [94539]   7. Encounter for screening mammogram for breast cancer Z12.31 MA Screen Bilateral w/Eber      EKG is down today - patient will  "return for the EKG for psychiatry medication monitoring.     Pap completed - refer to Pap RN team for future Pap schedule    Preventive labs and tests ordered as above.     Refills all come from psychiatry.     COUNSELING:  Reviewed preventive health counseling, as reflected in patient instructions    Estimated body mass index is 34.73 kg/m  as calculated from the following:    Height as of this encounter: 1.803 m (5' 11\").    Weight as of this encounter: 112.9 kg (249 lb).    Weight management plan: Discussed healthy diet and exercise guidelines     reports that she quit smoking about 5 years ago. She quit after 1.00 year of use. She has never used smokeless tobacco.      Counseling Resources:  ATP IV Guidelines  Pooled Cohorts Equation Calculator  Breast Cancer Risk Calculator  FRAX Risk Assessment  ICSI Preventive Guidelines  Dietary Guidelines for Americans, 2010  USDA's MyPlate  ASA Prophylaxis  Lung CA Screening    Rena Silva PA-C  [facility]  "

## 2020-02-04 NOTE — RESULT ENCOUNTER NOTE
Jayleen    Your lab tests are complete and I have reviewed the results.     - Your cholesterol is high but the American Heart Association does not recommend starting a medication to lower this at this time.  We will recheck next year.    Lifestyle changes to lower cholesterol:  1. Diet: Below are two links for information on diet changes that have been shown to lower cholesterol.   Check out this link from [facility]:  - [email or web address]   Check out this link from [facility]:  - [email or web address]  2. Exercise:  I recommend increasing exercise by one time a week to start.    The end goal for exercise is 150 minutes of exercise each week!    Diet and exercise are important!  - Improving your diet and increasing exercise has been shown to improve your overall health.  It is THE BEST preventive health measure.  It is also beneficial to mental health.   - A  or dietician are both great resources if that is available to you.       - Your kidney function (Cr, GFR) is normal.  - Your glucose (screening for diabetes) was normal.  - Your Blood Count Results show normal White Blood Cell count (no sign of infection), normal Hemoglobin (not anemia), and a high Platelet count (affects clotting).  Your platelet count has been slightly elevated consistently for many years, I am not concerned.  We will recheck in 6 months.     If you have any questions or concerns, please feel free to call or send a [employer] message.    Sincerely,  Fadi Silva PA-C

## 2020-02-05 LAB
COPATH REPORT: NORMAL
PAP: NORMAL

## 2020-02-06 LAB
FINAL DIAGNOSIS: NORMAL
HPV HR 12 DNA CVX QL NAA+PROBE: NEGATIVE
HPV16 DNA SPEC QL NAA+PROBE: NEGATIVE
HPV18 DNA SPEC QL NAA+PROBE: NEGATIVE
SPECIMEN DESCRIPTION: NORMAL
SPECIMEN SOURCE CVX/VAG CYTO: NORMAL

## 2020-06-24 ENCOUNTER — TRANSFERRED RECORDS (OUTPATIENT)
Dept: HEALTH INFORMATION MANAGEMENT | Facility: CLINIC | Age: 45
End: 2020-06-24

## 2020-06-26 ENCOUNTER — ALLIED HEALTH/NURSE VISIT (OUTPATIENT)
Dept: NURSING | Facility: CLINIC | Age: 45
End: 2020-06-26
Payer: COMMERCIAL

## 2020-06-26 DIAGNOSIS — Z13.6 CARDIOVASCULAR SCREENING; LDL GOAL LESS THAN 160: Primary | ICD-10-CM

## 2020-06-26 DIAGNOSIS — F33.1 MAJOR DEPRESSIVE DISORDER, RECURRENT EPISODE, MODERATE (H): ICD-10-CM

## 2020-06-26 DIAGNOSIS — Z00.00 ROUTINE GENERAL MEDICAL EXAMINATION AT A HEALTH CARE FACILITY: ICD-10-CM

## 2020-06-26 DIAGNOSIS — Z79.899 ENCOUNTER FOR LONG-TERM (CURRENT) USE OF OTHER MEDICATIONS: ICD-10-CM

## 2020-06-26 DIAGNOSIS — F30.9 BIPOLAR I DISORDER, SINGLE MANIC EPISODE (H): Primary | ICD-10-CM

## 2020-06-26 LAB
ANION GAP SERPL CALCULATED.3IONS-SCNC: 6 MMOL/L (ref 3–14)
BUN SERPL-MCNC: 9 MG/DL (ref 7–30)
CALCIUM SERPL-MCNC: 8.8 MG/DL (ref 8.5–10.1)
CHLORIDE SERPL-SCNC: 108 MMOL/L (ref 94–109)
CO2 SERPL-SCNC: 25 MMOL/L (ref 20–32)
CREAT SERPL-MCNC: 1.17 MG/DL (ref 0.52–1.04)
GFR SERPL CREATININE-BSD FRML MDRD: 57 ML/MIN/{1.73_M2}
GLUCOSE SERPL-MCNC: 108 MG/DL (ref 70–99)
POTASSIUM SERPL-SCNC: 4 MMOL/L (ref 3.4–5.3)
SODIUM SERPL-SCNC: 139 MMOL/L (ref 133–144)
T4 FREE SERPL-MCNC: 1 NG/DL (ref 0.76–1.46)
TSH SERPL DL<=0.005 MIU/L-ACNC: 2.58 MU/L (ref 0.4–4)

## 2020-06-26 PROCEDURE — 36415 COLL VENOUS BLD VENIPUNCTURE: CPT | Performed by: PHYSICIAN ASSISTANT

## 2020-06-26 PROCEDURE — 80048 BASIC METABOLIC PNL TOTAL CA: CPT | Performed by: PHYSICIAN ASSISTANT

## 2020-06-26 PROCEDURE — 93000 ELECTROCARDIOGRAM COMPLETE: CPT

## 2020-06-26 PROCEDURE — 84443 ASSAY THYROID STIM HORMONE: CPT | Performed by: PHYSICIAN ASSISTANT

## 2020-06-26 PROCEDURE — 84439 ASSAY OF FREE THYROXINE: CPT | Performed by: PHYSICIAN ASSISTANT

## 2020-10-26 ENCOUNTER — MEDICAL CORRESPONDENCE (OUTPATIENT)
Dept: HEALTH INFORMATION MANAGEMENT | Facility: CLINIC | Age: 45
End: 2020-10-26

## 2020-10-30 DIAGNOSIS — Z79.899 DRUG THERAPY: ICD-10-CM

## 2020-10-30 DIAGNOSIS — F31.81 BIPOLAR II DISORDER (H): Primary | ICD-10-CM

## 2020-11-22 ENCOUNTER — HEALTH MAINTENANCE LETTER (OUTPATIENT)
Age: 45
End: 2020-11-22

## 2021-04-05 ASSESSMENT — PATIENT HEALTH QUESTIONNAIRE - PHQ9
SUM OF ALL RESPONSES TO PHQ QUESTIONS 1-9: 14
10. IF YOU CHECKED OFF ANY PROBLEMS, HOW DIFFICULT HAVE THESE PROBLEMS MADE IT FOR YOU TO DO YOUR WORK, TAKE CARE OF THINGS AT HOME, OR GET ALONG WITH OTHER PEOPLE: VERY DIFFICULT
SUM OF ALL RESPONSES TO PHQ QUESTIONS 1-9: 14

## 2021-04-06 ENCOUNTER — OFFICE VISIT (OUTPATIENT)
Dept: FAMILY MEDICINE | Facility: CLINIC | Age: 46
End: 2021-04-06
Payer: COMMERCIAL

## 2021-04-06 VITALS
HEART RATE: 96 BPM | DIASTOLIC BLOOD PRESSURE: 72 MMHG | TEMPERATURE: 97.1 F | SYSTOLIC BLOOD PRESSURE: 132 MMHG | OXYGEN SATURATION: 97 % | WEIGHT: 241 LBS | HEIGHT: 71 IN | BODY MASS INDEX: 33.74 KG/M2

## 2021-04-06 DIAGNOSIS — Z12.31 ENCOUNTER FOR SCREENING MAMMOGRAM FOR BREAST CANCER: ICD-10-CM

## 2021-04-06 DIAGNOSIS — F33.1 MAJOR DEPRESSIVE DISORDER, RECURRENT EPISODE, MODERATE (H): ICD-10-CM

## 2021-04-06 DIAGNOSIS — Z51.81 ENCOUNTER FOR THERAPEUTIC DRUG MONITORING: ICD-10-CM

## 2021-04-06 DIAGNOSIS — Z78.9 VEGETARIAN DIET: ICD-10-CM

## 2021-04-06 DIAGNOSIS — N18.31 STAGE 3A CHRONIC KIDNEY DISEASE (H): ICD-10-CM

## 2021-04-06 DIAGNOSIS — Z00.00 ENCOUNTER FOR ROUTINE ADULT HEALTH EXAMINATION WITHOUT ABNORMAL FINDINGS: Primary | ICD-10-CM

## 2021-04-06 LAB
ALBUMIN SERPL-MCNC: 3.8 G/DL (ref 3.4–5)
ALP SERPL-CCNC: 75 U/L (ref 40–150)
ALT SERPL W P-5'-P-CCNC: 24 U/L (ref 0–50)
ANION GAP SERPL CALCULATED.3IONS-SCNC: 4 MMOL/L (ref 3–14)
AST SERPL W P-5'-P-CCNC: 13 U/L (ref 0–45)
BILIRUB SERPL-MCNC: 0.3 MG/DL (ref 0.2–1.3)
BUN SERPL-MCNC: 14 MG/DL (ref 7–30)
CALCIUM SERPL-MCNC: 9.1 MG/DL (ref 8.5–10.1)
CHLORIDE SERPL-SCNC: 107 MMOL/L (ref 94–109)
CHOLEST SERPL-MCNC: 243 MG/DL
CO2 SERPL-SCNC: 24 MMOL/L (ref 20–32)
CREAT SERPL-MCNC: 1.14 MG/DL (ref 0.52–1.04)
GFR SERPL CREATININE-BSD FRML MDRD: 58 ML/MIN/{1.73_M2}
GLUCOSE SERPL-MCNC: 86 MG/DL (ref 70–99)
HBA1C MFR BLD: 5.3 % (ref 0–5.6)
HDLC SERPL-MCNC: 51 MG/DL
LDLC SERPL CALC-MCNC: 161 MG/DL
NONHDLC SERPL-MCNC: 192 MG/DL
POTASSIUM SERPL-SCNC: 4.4 MMOL/L (ref 3.4–5.3)
PROT SERPL-MCNC: 7.9 G/DL (ref 6.8–8.8)
SODIUM SERPL-SCNC: 135 MMOL/L (ref 133–144)
TRIGL SERPL-MCNC: 153 MG/DL
TSH SERPL DL<=0.005 MIU/L-ACNC: 2.31 MU/L (ref 0.4–4)

## 2021-04-06 PROCEDURE — 84443 ASSAY THYROID STIM HORMONE: CPT | Performed by: PHYSICIAN ASSISTANT

## 2021-04-06 PROCEDURE — 93000 ELECTROCARDIOGRAM COMPLETE: CPT | Performed by: PHYSICIAN ASSISTANT

## 2021-04-06 PROCEDURE — 80053 COMPREHEN METABOLIC PANEL: CPT | Performed by: PHYSICIAN ASSISTANT

## 2021-04-06 PROCEDURE — 83921 ORGANIC ACID SINGLE QUANT: CPT | Performed by: PHYSICIAN ASSISTANT

## 2021-04-06 PROCEDURE — 99396 PREV VISIT EST AGE 40-64: CPT | Performed by: PHYSICIAN ASSISTANT

## 2021-04-06 PROCEDURE — 83036 HEMOGLOBIN GLYCOSYLATED A1C: CPT | Performed by: PHYSICIAN ASSISTANT

## 2021-04-06 PROCEDURE — 80061 LIPID PANEL: CPT | Performed by: PHYSICIAN ASSISTANT

## 2021-04-06 PROCEDURE — 86803 HEPATITIS C AB TEST: CPT | Performed by: PHYSICIAN ASSISTANT

## 2021-04-06 PROCEDURE — 36415 COLL VENOUS BLD VENIPUNCTURE: CPT | Performed by: PHYSICIAN ASSISTANT

## 2021-04-06 RX ORDER — DESVENLAFAXINE 50 MG/1
TABLET, EXTENDED RELEASE ORAL
COMMUNITY
Start: 2020-10-26 | End: 2021-04-06

## 2021-04-06 ASSESSMENT — MIFFLIN-ST. JEOR: SCORE: 1844.3

## 2021-04-06 ASSESSMENT — ENCOUNTER SYMPTOMS
EYES NEGATIVE: 1
NEUROLOGICAL NEGATIVE: 1
GASTROINTESTINAL NEGATIVE: 1
CONSTITUTIONAL NEGATIVE: 1
RESPIRATORY NEGATIVE: 1
CARDIOVASCULAR NEGATIVE: 1
MUSCULOSKELETAL NEGATIVE: 1

## 2021-04-06 ASSESSMENT — PATIENT HEALTH QUESTIONNAIRE - PHQ9: SUM OF ALL RESPONSES TO PHQ QUESTIONS 1-9: 14

## 2021-04-06 NOTE — PATIENT INSTRUCTIONS
While the state has opened eligibility to all people, our health system will continue to prioritize those groups who are most at risk of exposure to COVID-19 and/or hospitalization due to COVID-19. Once a larger percentage of these groups are vaccinated, we will open vaccine appointments to a larger group.      We are working hard to begin vaccinating more people against COVID-19. Beginning Wednesday, March 31, we are vaccinating:     Individuals age 50 and older.    All healthcare workers, both paid and unpaid.     People age 16 or 18-49 years with certain medical conditions or disabilities listed in Phase 1b tier 4.    People who identify as one or more of the following high-risk groups: Black/, /[place] Native, Southeast , /, and /.     If you fit into one of these categories, please log in to [facility] using this link to see if we have an open appointment and schedule an appointment.      If there are no appointments left, you will be unable to schedule.   If you have technical difficulty using [employer], call [phone] for assistance.      As vaccine supply increases and we are able to open appointments to more groups, we will share that information on our website:  [email or web address]. Check this website to stay up to date on COVID-19 vaccination information.              You are due for a mammogram, which is a study to screen for breast cancer.  [place] has several Breast Centers and also a walk-in mammogram service (no appointment needed!) at our [facility] location.  Below is the contact information.  Please complete the mammogram at your earliest convenience.    [facility] walk-in mammogram services:  Address:   [address] [place] [id number]  Hours: M/Tu/Th 7:00AM-7:00PM, W/F 7:00AM-5:00PM    [facility] [place] [address]  [address]  [place] [address]  Appointments: [phone] or [phone]  Hours: M-F 7:00AM-5:00PM    [facility]  [address]  [address]  [place] [address]  Appointments: [phone] or [phone]

## 2021-04-06 NOTE — PROGRESS NOTES
SUBJECTIVE:   CC: Jane E Seaver is an 45 year old woman who presents for preventive health visit.     Patient has been advised of split billing requirements and indicates understanding: Yes  Healthy Habits:     Getting at least 3 servings of Calcium per day:  Yes    Bi-annual eye exam:  Yes    Dental care twice a year:  Yes    Sleep apnea or symptoms of sleep apnea:  Sleep apnea    Diet:  Vegetarian/vegan    Duration of exercise:  Less than 15 minutes    Taking medications regularly:  Yes    Medication side effects:  None, No muscle aches and No significant flushing    PHQ-2 Total Score: 4    Additional concerns today:  Yes     Answers for HPI/ROS submitted by the patient on 4/5/2021   Annual Exam:  If you checked off any problems, how difficult have these problems made it for you to do your work, take care of things at home, or get along with other people?: Very difficult  PHQ9 TOTAL SCORE: 14        Today's PHQ-2 Score:   PHQ-2 ( 1999 [employer]) 4/5/2021   Q1: Little interest or pleasure in doing things 2   Q2: Feeling down, depressed or hopeless 2   PHQ-2 Score 4   Q1: Little interest or pleasure in doing things More than half the days   Q2: Feeling down, depressed or hopeless More than half the days   PHQ-2 Score 4     PHQ 1/17/2017 11/8/2018 4/5/2021   PHQ-9 Total Score 7 3 14   Q9: Thoughts of better off dead/self-harm past 2 weeks Not at all Not at all Not at all     No flowsheet data found.      Abuse: Current or Past (Physical, Sexual or Emotional) - No  Do you feel safe in your environment? Yes    Have you ever done Advance Care Planning? (For example, a Health Directive, POLST, or a discussion with a medical provider or your loved ones about your wishes): No, advance care planning information given to patient to review.  Patient declined advance care planning discussion at this time.    Social History     Tobacco Use     Smoking status: Former Smoker     Packs/day: 0.00     Years: 5.00     Pack years:  0.00     Types: Other     Start date: 2007     Quit date: 4/15/2014     Years since quittin.9     Smokeless tobacco: Never Used   Substance Use Topics     Alcohol use: Not Currently     Alcohol/week: 0.0 standard drinks     Comment: 1 glass of wine per month     If you drink alcohol do you typically have >3 drinks per day or >7 drinks per week? No    Alcohol Use 2021   Prescreen: >3 drinks/day or >7 drinks/week? -   Prescreen: >3 drinks/day or >7 drinks/week? No       Reviewed orders with patient.  Reviewed health maintenance and updated orders accordingly - Yes  Patient Active Problem List   Diagnosis     Bipolar I disorder, single manic episode (H)     Severe cervical dysplasia     Major depressive disorder, recurrent episode, moderate (H)     CARDIOVASCULAR SCREENING; LDL GOAL LESS THAN 160     Insomnia     CKD (chronic kidney disease) stage 3, GFR 30-59 ml/min     Past Surgical History:   Procedure Laterality Date     CL AFF SURGICAL PATHOLOGY  2006    HANY III     LEEP TX, CERVICAL  2006    cone bx removal       Social History     Tobacco Use     Smoking status: Former Smoker     Packs/day: 0.00     Years: 5.00     Pack years: 0.00     Types: Other     Start date: 2007     Quit date: 4/15/2014     Years since quittin.9     Smokeless tobacco: Never Used   Substance Use Topics     Alcohol use: Not Currently     Alcohol/week: 0.0 standard drinks     Comment: 1 glass of wine per month     Family History   Problem Relation Age of Onset     C.A.D. Maternal Grandfather 75     Lipids Maternal Grandfather      Hypertension Maternal Grandmother      Cerebrovascular Disease Maternal Grandmother      Depression Maternal Grandmother      Lipids Maternal Grandmother      Cancer Father         Lymphoma--leukemia  d. 52     Lipids Father      Allergies Father      Gastrointestinal Disease Father         crohns     Hyperlipidemia Father      Other Cancer Father      Gynecology Mother         fibroids,  hysterectomy at 48     Lipids Mother      Depression Mother      Hyperlipidemia Mother      Depression Paternal Grandmother         Schizophrenia     Other - See Comments Paternal Grandmother         MS     Mental Illness Paternal Grandmother      Depression Paternal Grandfather      Gastrointestinal Disease Paternal Grandfather      Cancer Paternal Grandfather         throat/esophageal     Other Cancer Paternal Grandfather      Other Cancer Other         Maternal Aunt     Depression Other         Maternal Aunt     Substance Abuse Other      Mental Illness Other         Paternal Uncle     Mental Illness Other         Paternal Uncle         Current Outpatient Medications   Medication Sig Dispense Refill     buPROPion (WELLBUTRIN XL) 300 MG 24 hr tablet Take 300 mg by mouth daily  5     desvenlafaxine succinate (PRISTIQ) 100 MG 24 hr tablet Take 1 tablet by mouth every morning  5     ziprasidone (GEODON) 40 MG capsule TAKE 1 CAPSULE(40 MG) BY MOUTH DAILY 30 capsule 0     Allergies   Allergen Reactions     Penicillins Hives       Breast Cancer Screening:    Breast CA Risk Assessment (FHS-7) 4/5/2021   Do you have a family history of breast, colon, or ovarian cancer? No / Unknown         Mammogram Screening - Offered annual screening and updated Health Maintenance for mutual plan based on risk factor consideration    Pertinent mammograms are reviewed under the imaging tab.    History of abnormal Pap smear: YES - updated in Problem List and Health Maintenance accordingly  PAP / HPV Latest Ref Rng & Units 2/3/2020 11/23/2016 7/15/2014   PAP - NIL NIL NIL   HPV 16 DNA NEG:Negative Negative Negative -   HPV 18 DNA NEG:Negative Negative Negative -   OTHER HR HPV NEG:Negative Negative Negative -     Reviewed and updated as needed this visit by clinical staff  Tobacco  Allergies  Meds              Reviewed and updated as needed this visit by Provider                    Review of Systems   Constitutional: Negative.    HENT:  "Negative.    Eyes: Negative.    Respiratory: Negative.    Cardiovascular: Negative.    Gastrointestinal: Negative.    Genitourinary: Negative.    Musculoskeletal: Negative.    Skin: Negative.    Neurological: Negative.    Psychiatric/Behavioral:        As in HPI          OBJECTIVE:   /72 (BP Location: Left arm, Cuff Size: Adult Large)   Pulse 96   Temp 97.1  F (36.2  C) (Tympanic)   Ht 1.803 m (5' 11\")   Wt 109.3 kg (241 lb)   SpO2 97%   BMI 33.61 kg/m    Physical Exam  Constitutional:       General: She is not in acute distress.     Appearance: She is well-developed.   HENT:      Right Ear: Tympanic membrane and external ear normal.      Left Ear: Tympanic membrane and external ear normal.      Nose: Nose normal.      Mouth/Throat:      Pharynx: No oropharyngeal exudate.   Eyes:      General:         Right eye: No discharge.         Left eye: No discharge.      Conjunctiva/sclera: Conjunctivae normal.      Pupils: Pupils are equal, round, and reactive to light.   Neck:      Musculoskeletal: Neck supple.      Thyroid: No thyromegaly.      Trachea: No tracheal deviation.   Cardiovascular:      Rate and Rhythm: Normal rate and regular rhythm.      Pulses: Normal pulses.      Heart sounds: Normal heart sounds, S1 normal and S2 normal. No murmur. No friction rub. No S3 or S4 sounds.    Pulmonary:      Effort: Pulmonary effort is normal. No respiratory distress.      Breath sounds: Normal breath sounds. No wheezing or rales.   Chest:      Breasts:         Right: No mass, nipple discharge or tenderness.         Left: No mass, nipple discharge or tenderness.   Abdominal:      Palpations: Abdomen is soft. There is no mass.      Tenderness: There is no abdominal tenderness.   Musculoskeletal: Normal range of motion.   Lymphadenopathy:      Cervical: No cervical adenopathy.   Skin:     General: Skin is warm and dry.      Findings: No rash.   Neurological:      Mental Status: She is alert and oriented to person, " "place, and time.      Motor: No abnormal muscle tone.   Psychiatric:         Thought Content: Thought content normal.         Judgment: Judgment normal.           Diagnostic Test Results:  No results found for this or any previous visit (from the past 24 hour(s)).  EKG - normal sinus rhythm, normal QT intervall    ASSESSMENT/PLAN:       ICD-10-CM    1. Encounter for routine adult health examination without abnormal findings  Z00.00 Hepatitis C Screen Reflex to HCV RNA Quant and Genotype     Lipid panel reflex to direct LDL Fasting     Hemoglobin A1c     Comprehensive metabolic panel (BMP + Alb, Alk Phos, ALT, AST, Total. Bili, TP)     TSH with free T4 reflex   2. Major depressive disorder, recurrent episode, moderate (H)  F33.1    3. Stage 3a chronic kidney disease  N18.31    4. Encounter for screening mammogram for breast cancer  Z12.31 MA Screen Bilateral w/Eber   5. Vegetarian diet  Z78.9 Methylmalonic Acid   6. Encounter for therapeutic drug monitoring  Z51.81 EKG 12-lead complete w/read - Clinics      MDD - managed by psychiatry, goes to CBT, COVID has made this more difficult  Labs as above for prev care.       Patient has been advised of split billing requirements and indicates understanding: Yes  COUNSELING:  Reviewed preventive health counseling, as reflected in patient instructions    Estimated body mass index is 33.61 kg/m  as calculated from the following:    Height as of this encounter: 1.803 m (5' 11\").    Weight as of this encounter: 109.3 kg (241 lb).    Weight management plan: Discussed healthy diet and exercise guidelines    She reports that she quit smoking about 6 years ago. Her smoking use included other. She started smoking about 14 years ago. She smoked 0.00 packs per day for 5.00 years. She has never used smokeless tobacco.      Counseling Resources:  ATP IV Guidelines  Pooled Cohorts Equation Calculator  Breast Cancer Risk Calculator  BRCA-Related Cancer Risk Assessment: FHS-7 Tool  FRAX " Risk Assessment  ICSI Preventive Guidelines  Dietary Guidelines for Americans, 2010  USDA's MyPlate  ASA Prophylaxis  Lung CA Screening    BRIAN Tipton [facility]

## 2021-04-07 LAB — HCV AB SERPL QL IA: NONREACTIVE

## 2021-04-08 ENCOUNTER — IMMUNIZATION (OUTPATIENT)
Dept: NURSING | Facility: CLINIC | Age: 46
End: 2021-04-08
Payer: COMMERCIAL

## 2021-04-08 DIAGNOSIS — Z79.899 HIGH RISK MEDICATION USE: ICD-10-CM

## 2021-04-08 DIAGNOSIS — F31.9 BIPOLAR DISORDER (H): Primary | ICD-10-CM

## 2021-04-08 LAB — METHYLMALONATE SERPL-SCNC: 0.44 UMOL/L (ref 0–0.4)

## 2021-04-08 PROCEDURE — 0001A PR COVID VAC PFIZER DIL RECON 30 MCG/0.3 ML IM: CPT

## 2021-04-08 PROCEDURE — 91300 PR COVID VAC PFIZER DIL RECON 30 MCG/0.3 ML IM: CPT

## 2021-04-08 NOTE — RESULT ENCOUNTER NOTE
Jayleen    Your lab tests are complete and I have reviewed the results.     - Your cholesterol is high but the American Heart Association does not recommend starting a medication to lower this at this time.  We will recheck next year.    Lifestyle changes to lower cholesterol:  1. Diet: Eating a Mediterranean diet can help lower cholesterol.   Check out this link from [facility]:  - [email or web address]  2. Exercise:  I recommend increasing exercise by one time a week to start.    The end goal for exercise is 150 minutes of exercise each week!    Diet and exercise are important!  - Improving your diet and increasing exercise has been shown to improve your overall health.  It is THE BEST preventive health measure.  It is also beneficial to mental health.   - A  or dietician are both great resources if that is available to you.       - Your metabolic panel shows:  normal electrolytes (sodium, potassium, calcium) decreased but stable kidney function (creatinine and GFR) normal liver function (AST/ALT)  - Your A1c is normal.  - Your TSH, a screening test for thyroid disease, was normal.  - Your B12 levels are slightly low - I recommend you supplement vitamin B12: please take 1000 mcg once a day.  This is over the counter.   - Your Hepatitis C Screening test was negative for infection.    If you have any questions or concerns, please feel free to call or send a [employer] message.    Sincerely,  Fadi Silva PA-C

## 2021-04-26 ENCOUNTER — TRANSFERRED RECORDS (OUTPATIENT)
Dept: HEALTH INFORMATION MANAGEMENT | Facility: CLINIC | Age: 46
End: 2021-04-26

## 2021-04-29 ENCOUNTER — IMMUNIZATION (OUTPATIENT)
Dept: NURSING | Facility: CLINIC | Age: 46
End: 2021-04-29
Attending: FAMILY MEDICINE
Payer: COMMERCIAL

## 2021-04-29 PROCEDURE — 91300 PR COVID VAC PFIZER DIL RECON 30 MCG/0.3 ML IM: CPT

## 2021-04-29 PROCEDURE — 0002A PR COVID VAC PFIZER DIL RECON 30 MCG/0.3 ML IM: CPT

## 2021-05-25 ENCOUNTER — TELEPHONE (OUTPATIENT)
Dept: FAMILY MEDICINE | Facility: CLINIC | Age: 46
End: 2021-05-25

## 2021-05-25 NOTE — TELEPHONE ENCOUNTER
Received consent for ROBYN from [employer] faxed over requested information to [phone] and copy of ROBYN given to  to scan in.   Elizabeth Jhonson

## 2021-09-19 ENCOUNTER — HEALTH MAINTENANCE LETTER (OUTPATIENT)
Age: 46
End: 2021-09-19

## 2022-02-14 ENCOUNTER — IMMUNIZATION (OUTPATIENT)
Dept: NURSING | Facility: CLINIC | Age: 47
End: 2022-02-14
Payer: COMMERCIAL

## 2022-02-14 PROCEDURE — 91305 COVID-19,PF,PFIZER (12+ YRS): CPT

## 2022-02-14 PROCEDURE — 0054A COVID-19,PF,PFIZER (12+ YRS): CPT

## 2022-02-19 ASSESSMENT — ENCOUNTER SYMPTOMS
COUGH: 0
NERVOUS/ANXIOUS: 1
SORE THROAT: 0
PALPITATIONS: 0
HEADACHES: 0
CONSTIPATION: 0
MYALGIAS: 0
EYE PAIN: 0
CHILLS: 0
DYSURIA: 0
PARESTHESIAS: 0
WEAKNESS: 0
DIARRHEA: 0
HEMATURIA: 0
FREQUENCY: 0
HEMATOCHEZIA: 1
BREAST MASS: 0
HEARTBURN: 0
NAUSEA: 0
SHORTNESS OF BREATH: 0
FEVER: 0
JOINT SWELLING: 0
DIZZINESS: 0
ABDOMINAL PAIN: 0
ARTHRALGIAS: 1

## 2022-02-22 ENCOUNTER — OFFICE VISIT (OUTPATIENT)
Dept: FAMILY MEDICINE | Facility: CLINIC | Age: 47
End: 2022-02-22
Payer: COMMERCIAL

## 2022-02-22 VITALS
RESPIRATION RATE: 12 BRPM | SYSTOLIC BLOOD PRESSURE: 126 MMHG | OXYGEN SATURATION: 96 % | HEART RATE: 93 BPM | TEMPERATURE: 98 F | BODY MASS INDEX: 28.53 KG/M2 | HEIGHT: 71 IN | WEIGHT: 203.8 LBS | DIASTOLIC BLOOD PRESSURE: 76 MMHG

## 2022-02-22 DIAGNOSIS — F31.70 BIPOLAR DISORDER IN PARTIAL REMISSION, MOST RECENT EPISODE UNSPECIFIED TYPE (H): ICD-10-CM

## 2022-02-22 DIAGNOSIS — Z79.899 HIGH RISK MEDICATION USE: ICD-10-CM

## 2022-02-22 DIAGNOSIS — Z12.11 SPECIAL SCREENING FOR MALIGNANT NEOPLASMS, COLON: ICD-10-CM

## 2022-02-22 DIAGNOSIS — F33.1 MAJOR DEPRESSIVE DISORDER, RECURRENT EPISODE, MODERATE (H): ICD-10-CM

## 2022-02-22 DIAGNOSIS — N18.31 STAGE 3A CHRONIC KIDNEY DISEASE (H): ICD-10-CM

## 2022-02-22 DIAGNOSIS — Z00.00 ENCOUNTER FOR ROUTINE ADULT HEALTH EXAMINATION WITHOUT ABNORMAL FINDINGS: Primary | ICD-10-CM

## 2022-02-22 DIAGNOSIS — E53.8 VITAMIN B12 DEFICIENCY (NON ANEMIC): ICD-10-CM

## 2022-02-22 DIAGNOSIS — Z23 NEED FOR IMMUNIZATION AGAINST INFLUENZA: ICD-10-CM

## 2022-02-22 LAB
BASOPHILS # BLD AUTO: 0 10E3/UL (ref 0–0.2)
BASOPHILS NFR BLD AUTO: 0 %
EOSINOPHIL # BLD AUTO: 0.1 10E3/UL (ref 0–0.7)
EOSINOPHIL NFR BLD AUTO: 3 %
ERYTHROCYTE [DISTWIDTH] IN BLOOD BY AUTOMATED COUNT: 12.6 % (ref 10–15)
HBA1C MFR BLD: 5.3 % (ref 0–5.6)
HCT VFR BLD AUTO: 40 % (ref 35–47)
HGB BLD-MCNC: 13.1 G/DL (ref 11.7–15.7)
LYMPHOCYTES # BLD AUTO: 1.5 10E3/UL (ref 0.8–5.3)
LYMPHOCYTES NFR BLD AUTO: 29 %
MCH RBC QN AUTO: 31.4 PG (ref 26.5–33)
MCHC RBC AUTO-ENTMCNC: 32.8 G/DL (ref 31.5–36.5)
MCV RBC AUTO: 96 FL (ref 78–100)
MONOCYTES # BLD AUTO: 0.4 10E3/UL (ref 0–1.3)
MONOCYTES NFR BLD AUTO: 7 %
NEUTROPHILS # BLD AUTO: 3.1 10E3/UL (ref 1.6–8.3)
NEUTROPHILS NFR BLD AUTO: 61 %
PLATELET # BLD AUTO: 398 10E3/UL (ref 150–450)
RBC # BLD AUTO: 4.17 10E6/UL (ref 3.8–5.2)
WBC # BLD AUTO: 5.1 10E3/UL (ref 4–11)

## 2022-02-22 PROCEDURE — 82043 UR ALBUMIN QUANTITATIVE: CPT | Performed by: PHYSICIAN ASSISTANT

## 2022-02-22 PROCEDURE — 90686 IIV4 VACC NO PRSV 0.5 ML IM: CPT | Performed by: PHYSICIAN ASSISTANT

## 2022-02-22 PROCEDURE — 90471 IMMUNIZATION ADMIN: CPT | Performed by: PHYSICIAN ASSISTANT

## 2022-02-22 PROCEDURE — 99396 PREV VISIT EST AGE 40-64: CPT | Mod: 25 | Performed by: PHYSICIAN ASSISTANT

## 2022-02-22 PROCEDURE — 93000 ELECTROCARDIOGRAM COMPLETE: CPT | Performed by: PHYSICIAN ASSISTANT

## 2022-02-22 PROCEDURE — 83036 HEMOGLOBIN GLYCOSYLATED A1C: CPT | Performed by: PHYSICIAN ASSISTANT

## 2022-02-22 PROCEDURE — 80050 GENERAL HEALTH PANEL: CPT | Performed by: PHYSICIAN ASSISTANT

## 2022-02-22 PROCEDURE — 36415 COLL VENOUS BLD VENIPUNCTURE: CPT | Performed by: PHYSICIAN ASSISTANT

## 2022-02-22 PROCEDURE — 83921 ORGANIC ACID SINGLE QUANT: CPT | Performed by: PHYSICIAN ASSISTANT

## 2022-02-22 PROCEDURE — 84439 ASSAY OF FREE THYROXINE: CPT | Performed by: PHYSICIAN ASSISTANT

## 2022-02-22 PROCEDURE — 99214 OFFICE O/P EST MOD 30 MIN: CPT | Mod: 25 | Performed by: PHYSICIAN ASSISTANT

## 2022-02-22 RX ORDER — LANOLIN ALCOHOL/MO/W.PET/CERES
1000 CREAM (GRAM) TOPICAL
COMMUNITY

## 2022-02-22 ASSESSMENT — ENCOUNTER SYMPTOMS
COUGH: 0
HEADACHES: 0
ARTHRALGIAS: 1
CONSTIPATION: 0
SORE THROAT: 0
DIZZINESS: 0
HEMATOCHEZIA: 1
HEARTBURN: 0
SHORTNESS OF BREATH: 0
FEVER: 0
BREAST MASS: 0
PALPITATIONS: 0
FREQUENCY: 0
EYE PAIN: 0
WEAKNESS: 0
MYALGIAS: 0
HEMATURIA: 0
DYSURIA: 0
NAUSEA: 0
JOINT SWELLING: 0
CHILLS: 0
PARESTHESIAS: 0
NERVOUS/ANXIOUS: 1
ABDOMINAL PAIN: 0
DIARRHEA: 0

## 2022-02-22 ASSESSMENT — PAIN SCALES - GENERAL: PAINLEVEL: NO PAIN (0)

## 2022-02-22 NOTE — PROGRESS NOTES
SUBJECTIVE:   CC: Jane E Seaver is an 46 year old woman who presents for preventive health visit.     Patient has been advised of split billing requirements and indicates understanding: Yes  Healthy Habits:     Getting at least 3 servings of Calcium per day:  Yes    Bi-annual eye exam:  Yes    Dental care twice a year:  Yes    Sleep apnea or symptoms of sleep apnea:  Sleep apnea    Diet:  Vegetarian/vegan    Frequency of exercise:  1 day/week    Duration of exercise:  15-30 minutes    Taking medications regularly:  Yes    Medication side effects:  None    PHQ-2 Total Score: 2    Additional concerns today:  Yes    Patient lost approximately 50 pounds in the past year!!          Today's PHQ-2 Score:   PHQ-2 (  [employer]) 2022   Q1: Little interest or pleasure in doing things 1   Q2: Feeling down, depressed or hopeless 1   PHQ-2 Score 2   PHQ-2 Total Score (12-17 Years)- Positive if 3 or more points; Administer PHQ-A if positive -   Q1: Little interest or pleasure in doing things Several days   Q2: Feeling down, depressed or hopeless Several days   PHQ-2 Score 2       PHQ 2017   PHQ-9 Total Score 7 3 14   Q9: Thoughts of better off dead/self-harm past 2 weeks Not at all Not at all Not at all     No flowsheet data found.          Abuse: Current or Past (Physical, Sexual or Emotional) - No  Do you feel safe in your environment? Yes        Social History     Tobacco Use     Smoking status: Former Smoker     Packs/day: 0.00     Years: 5.00     Pack years: 0.00     Types: Other     Start date: 2007     Quit date: 4/15/2014     Years since quittin.8     Smokeless tobacco: Never Used   Substance Use Topics     Alcohol use: Not Currently     Alcohol/week: 0.0 standard drinks     Comment: 1 glass of wine per month     If you drink alcohol do you typically have >3 drinks per day or >7 drinks per week? No    Alcohol Use 2022   Prescreen: >3 drinks/day or >7 drinks/week? -   Prescreen:  >3 drinks/day or >7 drinks/week? No       Reviewed orders with patient.  Reviewed health maintenance and updated orders accordingly - Yes  Patient Active Problem List   Diagnosis     Bipolar I disorder, single manic episode (H)     Severe cervical dysplasia     Major depressive disorder, recurrent episode, moderate (H)     CARDIOVASCULAR SCREENING; LDL GOAL LESS THAN 160     Insomnia     CKD (chronic kidney disease) stage 3, GFR 30-59 ml/min (H)     Past Surgical History:   Procedure Laterality Date     CL AFF SURGICAL PATHOLOGY  2006    HANY III     LEEP TX, CERVICAL  2006    cone bx removal       Social History     Tobacco Use     Smoking status: Former Smoker     Packs/day: 0.00     Years: 5.00     Pack years: 0.00     Types: Other     Start date: 2007     Quit date: 4/15/2014     Years since quittin.8     Smokeless tobacco: Never Used   Substance Use Topics     Alcohol use: Not Currently     Alcohol/week: 0.0 standard drinks     Comment: 1 glass of wine per month     Family History   Problem Relation Age of Onset     C.A.D. Maternal Grandfather 75     Lipids Maternal Grandfather      Hypertension Maternal Grandmother      Cerebrovascular Disease Maternal Grandmother      Depression Maternal Grandmother      Lipids Maternal Grandmother      Cancer Father         Lymphoma--leukemia  d. 52     Lipids Father      Allergies Father      Gastrointestinal Disease Father         crohns     Hyperlipidemia Father      Other Cancer Father      Gynecology Mother         fibroids, hysterectomy at 48     Lipids Mother      Depression Mother      Hyperlipidemia Mother      Depression Paternal Grandmother         Schizophrenia     Other - See Comments Paternal Grandmother         MS     Mental Illness Paternal Grandmother      Depression Paternal Grandfather      Gastrointestinal Disease Paternal Grandfather      Cancer Paternal Grandfather         throat/esophageal     Other Cancer Paternal Grandfather      Other Cancer  Other         Maternal Aunt     Depression Other         Maternal Aunt     Substance Abuse Other      Mental Illness Other         Paternal Uncle     Mental Illness Other         Paternal Uncle         Current Outpatient Medications   Medication Sig Dispense Refill     buPROPion (WELLBUTRIN XL) 300 MG 24 hr tablet Take 300 mg by mouth daily  5     cyanocobalamin (VITAMIN B-12) 1000 MCG tablet Take 1,000 mcg by mouth twice a week       desvenlafaxine succinate (PRISTIQ) 100 MG 24 hr tablet Take 1 tablet by mouth every morning  5     ziprasidone (GEODON) 40 MG capsule TAKE 1 CAPSULE(40 MG) BY MOUTH DAILY 30 capsule 0     Allergies   Allergen Reactions     Penicillins Hives       Breast Cancer Screening:  Any new diagnosis of family breast, ovarian, or bowel cancer? No    Breast CA Risk Assessment (FHS-7) 4/5/2021   Do you have a family history of breast, colon, or ovarian cancer? No / Unknown         Mammogram Screening - Offered annual screening and updated Health Maintenance for mutual plan based on risk factor consideration    Pertinent mammograms are reviewed under the imaging tab.    History of abnormal Pap smear: NO - age 30-65 PAP every 5 years with negative HPV co-testing recommended  PAP / HPV Latest Ref Rng & Units 2/3/2020 11/23/2016 7/15/2014   PAP (Historical) - NIL NIL NIL   HPV16 NEG:Negative Negative Negative -   HPV18 NEG:Negative Negative Negative -   HRHPV NEG:Negative Negative Negative -     Reviewed and updated as needed this visit by clinical staff   Tobacco  Allergies  Meds              Reviewed and updated as needed this visit by Provider                     Review of Systems   Constitutional: Negative for chills and fever.   HENT: Negative for congestion, ear pain, hearing loss and sore throat.    Eyes: Negative for pain and visual disturbance.   Respiratory: Negative for cough and shortness of breath.    Cardiovascular: Negative for chest pain, palpitations and peripheral edema.  "  Gastrointestinal: Positive for hematochezia. Negative for abdominal pain, constipation, diarrhea, heartburn and nausea.   Breasts:  Negative for tenderness, breast mass and discharge.   Genitourinary: Negative for dysuria, frequency, genital sores, hematuria, pelvic pain, urgency, vaginal bleeding and vaginal discharge.   Musculoskeletal: Positive for arthralgias. Negative for joint swelling and myalgias.   Skin: Negative for rash.   Neurological: Negative for dizziness, weakness, headaches and paresthesias.   Psychiatric/Behavioral: Positive for mood changes. The patient is nervous/anxious.           OBJECTIVE:   /76   Pulse 93   Temp 98  F (36.7  C) (Tympanic)   Resp 12   Ht 1.791 m (5' 10.51\")   Wt 92.4 kg (203 lb 12.8 oz)   LMP 02/10/2022   SpO2 96%   BMI 28.82 kg/m    Physical Exam  Constitutional:       General: She is not in acute distress.     Appearance: She is well-developed.   HENT:      Right Ear: Tympanic membrane and external ear normal.      Left Ear: Tympanic membrane and external ear normal.   Eyes:      General:         Right eye: No discharge.         Left eye: No discharge.      Conjunctiva/sclera: Conjunctivae normal.      Pupils: Pupils are equal, round, and reactive to light.   Neck:      Thyroid: No thyromegaly.      Trachea: No tracheal deviation.   Cardiovascular:      Rate and Rhythm: Normal rate and regular rhythm.      Pulses: Normal pulses.      Heart sounds: Normal heart sounds, S1 normal and S2 normal. No murmur heard.    No friction rub. No S3 or S4 sounds.   Pulmonary:      Effort: Pulmonary effort is normal. No respiratory distress.      Breath sounds: Normal breath sounds. No wheezing or rales.   Chest:   Breasts:      Right: No mass, nipple discharge or tenderness.      Left: No mass, nipple discharge or tenderness.       Abdominal:      Palpations: Abdomen is soft. There is no mass.      Tenderness: There is no abdominal tenderness.   Musculoskeletal:         " General: Normal range of motion.      Cervical back: Neck supple.   Lymphadenopathy:      Cervical: No cervical adenopathy.   Skin:     General: Skin is warm and dry.      Findings: No rash.   Neurological:      Mental Status: She is alert and oriented to person, place, and time.      Motor: No abnormal muscle tone.   Psychiatric:         Thought Content: Thought content normal.         Judgment: Judgment normal.           Diagnostic Test Results:  Results for orders placed or performed in visit on 02/22/22 (from the past 24 hour(s))   CBC with platelets differential    Narrative    The following orders were created for panel order CBC with platelets differential.  Procedure                               Abnormality         Status                     ---------                               -----------         ------                     CBC with platelets and d...[[id number]]                      Final result                 Please view results for these tests on the individual orders.   CBC with platelets and differential   Result Value Ref Range    WBC Count 5.1 4.0 - 11.0 10e3/uL    RBC Count 4.17 3.80 - 5.20 10e6/uL    Hemoglobin 13.1 11.7 - 15.7 g/dL    Hematocrit 40.0 35.0 - 47.0 %    MCV 96 78 - 100 fL    MCH 31.4 26.5 - 33.0 pg    MCHC 32.8 31.5 - 36.5 g/dL    RDW 12.6 10.0 - 15.0 %    Platelet Count 398 150 - 450 10e3/uL    % Neutrophils 61 %    % Lymphocytes 29 %    % Monocytes 7 %    % Eosinophils 3 %    % Basophils 0 %    Absolute Neutrophils 3.1 1.6 - 8.3 10e3/uL    Absolute Lymphocytes 1.5 0.8 - 5.3 10e3/uL    Absolute Monocytes 0.4 0.0 - 1.3 10e3/uL    Absolute Eosinophils 0.1 0.0 - 0.7 10e3/uL    Absolute Basophils 0.0 0.0 - 0.2 10e3/uL       EKG - NSR, normal QT interval    ASSESSMENT/PLAN:   Jayleen was seen today for physical.    Diagnoses and all orders for this visit:    Encounter for routine adult health examination without abnormal findings  -     Hemoglobin; Future  -     Cancel:  "Hemoglobin    Major depressive disorder, recurrent episode, moderate (H)  -     EKG 12-lead complete w/read - Clinics    Stage 3a chronic kidney disease (H)  -     Albumin Random Urine Quantitative with Creat Ratio; Future  -     Albumin Random Urine Quantitative with Creat Ratio    Vitamin B12 deficiency (non anemic)  -     Methylmalonic Acid; Future  -     Methylmalonic Acid    Need for immunization against influenza  -     HC FLU VAC PRESRV FREE QUAD SPLIT VIR > 6 MONTHS IM ([id number])    Special screening for malignant neoplasms, colon  -     Adult Gastro Ref - Procedure Only; Future    Bipolar disorder (H)  -     **A1C FUTURE anytime  -     CBC with platelets differential  -     Comprehensive metabolic panel  -     T4, free  -     TSH    High risk medication use  -     **A1C FUTURE anytime  -     CBC with platelets differential  -     Comprehensive metabolic panel  -     T4, free  -     TSH    Other orders  -     REVIEW OF HEALTH MAINTENANCE PROTOCOL ORDERS      Preventive care as above  EKG and labs ordered for medication monitoring per Brittanie Gonzales CNP Psychiatry  Hx of B12 deficiency - MMA ordered  CKD - microalbumin and cr/GFR drawn today          COUNSELING:  Reviewed preventive health counseling, as reflected in patient instructions    Estimated body mass index is 28.82 kg/m  as calculated from the following:    Height as of this encounter: 1.791 m (5' 10.51\").    Weight as of this encounter: 92.4 kg (203 lb 12.8 oz).    Weight management plan: Discussed healthy diet and exercise guidelines    She reports that she quit smoking about 7 years ago. Her smoking use included other. She started smoking about 15 years ago. She smoked 0.00 packs per day for 5.00 years. She has never used smokeless tobacco.      Counseling Resources:  ATP IV Guidelines  Pooled Cohorts Equation Calculator  Breast Cancer Risk Calculator  BRCA-Related Cancer Risk Assessment: FHS-7 Tool  FRAX Risk Assessment  ICSI Preventive " Guidelines  Dietary Guidelines for Americans, 2010  USDA's MyPlate  ASA Prophylaxis  Lung CA Screening    BRIAN Tipton [facility]

## 2022-02-23 LAB
ALBUMIN SERPL-MCNC: 3.6 G/DL (ref 3.4–5)
ALP SERPL-CCNC: 74 U/L (ref 40–150)
ALT SERPL W P-5'-P-CCNC: 22 U/L (ref 0–50)
ANION GAP SERPL CALCULATED.3IONS-SCNC: 6 MMOL/L (ref 3–14)
AST SERPL W P-5'-P-CCNC: 14 U/L (ref 0–45)
BILIRUB SERPL-MCNC: 0.2 MG/DL (ref 0.2–1.3)
BUN SERPL-MCNC: 11 MG/DL (ref 7–30)
CALCIUM SERPL-MCNC: 9.3 MG/DL (ref 8.5–10.1)
CHLORIDE BLD-SCNC: 106 MMOL/L (ref 94–109)
CO2 SERPL-SCNC: 26 MMOL/L (ref 20–32)
CREAT SERPL-MCNC: 1.05 MG/DL (ref 0.52–1.04)
CREAT UR-MCNC: 145 MG/DL
GFR SERPL CREATININE-BSD FRML MDRD: 67 ML/MIN/1.73M2
GLUCOSE BLD-MCNC: 101 MG/DL (ref 70–99)
MICROALBUMIN UR-MCNC: 6 MG/L
MICROALBUMIN/CREAT UR: 4.14 MG/G CR (ref 0–25)
POTASSIUM BLD-SCNC: 4.5 MMOL/L (ref 3.4–5.3)
PROT SERPL-MCNC: 7.3 G/DL (ref 6.8–8.8)
SODIUM SERPL-SCNC: 138 MMOL/L (ref 133–144)
T4 FREE SERPL-MCNC: 0.98 NG/DL (ref 0.76–1.46)
TSH SERPL DL<=0.005 MIU/L-ACNC: 1.62 MU/L (ref 0.4–4)

## 2022-02-24 LAB — METHYLMALONATE SERPL-SCNC: 0.26 UMOL/L (ref 0–0.4)

## 2022-02-28 NOTE — RESULT ENCOUNTER NOTE
Jayleen    Your lab tests are complete and I have reviewed the results.     - Your remaining lab results look great; everything is normal.    If you have any questions or concerns, please feel free to call or send a [employer] message.    Sincerely,  Fadi Silva PA-C

## 2022-03-08 ENCOUNTER — ANCILLARY PROCEDURE (OUTPATIENT)
Dept: MAMMOGRAPHY | Facility: CLINIC | Age: 47
End: 2022-03-08
Payer: COMMERCIAL

## 2022-03-08 DIAGNOSIS — Z12.31 ENCOUNTER FOR SCREENING MAMMOGRAM FOR BREAST CANCER: ICD-10-CM

## 2022-03-08 PROCEDURE — 77067 SCR MAMMO BI INCL CAD: CPT | Mod: TC | Performed by: RADIOLOGY

## 2022-03-08 PROCEDURE — 77063 BREAST TOMOSYNTHESIS BI: CPT | Mod: TC | Performed by: RADIOLOGY

## 2022-03-11 ENCOUNTER — LAB (OUTPATIENT)
Dept: LAB | Facility: CLINIC | Age: 47
End: 2022-03-11
Payer: COMMERCIAL

## 2022-03-11 DIAGNOSIS — F31.9 BIPOLAR DISORDER (H): ICD-10-CM

## 2022-03-11 DIAGNOSIS — Z79.899 HIGH RISK MEDICATION USE: ICD-10-CM

## 2022-03-11 LAB
CHOLEST SERPL-MCNC: 168 MG/DL
FASTING STATUS PATIENT QL REPORTED: ABNORMAL
FASTING STATUS PATIENT QL REPORTED: NORMAL
GLUCOSE BLD-MCNC: 88 MG/DL (ref 70–99)
HDLC SERPL-MCNC: 49 MG/DL
LDLC SERPL CALC-MCNC: 96 MG/DL
NONHDLC SERPL-MCNC: 119 MG/DL
TRIGL SERPL-MCNC: 117 MG/DL

## 2022-03-11 PROCEDURE — 82947 ASSAY GLUCOSE BLOOD QUANT: CPT

## 2022-03-11 PROCEDURE — 36415 COLL VENOUS BLD VENIPUNCTURE: CPT

## 2022-03-11 PROCEDURE — 80061 LIPID PANEL: CPT

## 2022-03-16 ENCOUNTER — HOSPITAL ENCOUNTER (OUTPATIENT)
Dept: MAMMOGRAPHY | Facility: CLINIC | Age: 47
Discharge: HOME | End: 2022-03-16
Attending: PHYSICIAN ASSISTANT
Payer: COMMERCIAL

## 2022-03-16 DIAGNOSIS — R92.8 ABNORMAL MAMMOGRAM: ICD-10-CM

## 2022-03-16 PROCEDURE — 77061 BREAST TOMOSYNTHESIS UNI: CPT | Mod: LT

## 2022-06-13 ENCOUNTER — TRANSFERRED RECORDS (OUTPATIENT)
Dept: HEALTH INFORMATION MANAGEMENT | Facility: CLINIC | Age: 47
End: 2022-06-13
Payer: COMMERCIAL

## 2022-11-21 ENCOUNTER — HEALTH MAINTENANCE LETTER (OUTPATIENT)
Age: 47
End: 2022-11-21

## 2023-04-16 ENCOUNTER — HEALTH MAINTENANCE LETTER (OUTPATIENT)
Age: 48
End: 2023-04-16

## 2023-06-02 ENCOUNTER — HEALTH MAINTENANCE LETTER (OUTPATIENT)
Age: 48
End: 2023-06-02

## 2024-06-22 ENCOUNTER — HEALTH MAINTENANCE LETTER (OUTPATIENT)
Age: 49
End: 2024-06-22

## 2025-07-01 NOTE — MR AVS SNAPSHOT
After Visit Summary   11/8/2018    Jane Seaver    MRN: [id number]           Patient Information     Date Of Birth          1975        Visit Information        Provider Department      11/8/2018 9:50 AM Rena Silva PA-C [facility]        Today's Diagnoses     Encounter for routine adult medical exam with abnormal findings    -  1    Need for prophylactic vaccination and inoculation against influenza        Bipolar I disorder, single manic episode (H)        Screening for malignant neoplasm of breast        Major depressive disorder, recurrent episode, moderate (H)          Care Instructions      Preventive Health Recommendations  Female Ages 40 to 49    Yearly exam:     See your health care provider every year in order to  1. Review health changes.   2. Discuss preventive care.    3. Review your medicines if your doctor prescribed any.      Get a Pap test every three years (unless you have an abnormal result and your provider advises testing more often).      If you get Pap tests with HPV test, you only need to test every 5 years, unless you have an abnormal result. You do not need a Pap test if your uterus was removed (hysterectomy) and you have not had cancer.      You should be tested each year for STDs (sexually transmitted diseases), if you're at risk.     Ask your doctor if you should have a mammogram.      Have a colonoscopy (test for colon cancer) if someone in your family has had colon cancer or polyps before age 50.       Have a cholesterol test every 5 years.       Have a diabetes test (fasting glucose) after age 45. If you are at risk for diabetes, you should have this test every 3 years.    Shots: Get a flu shot each year. Get a tetanus shot every 10 years.     Nutrition:     Eat at least 5 servings of fruits and vegetables each day.    Eat whole-grain bread, whole-wheat pasta and brown rice instead of white grains and rice.    Get adequate  Calcium and Vitamin D.      Lifestyle    Exercise at least 150 minutes a week (an average of 30 minutes a day, 5 days a week). This will help you control your weight and prevent disease.    Limit alcohol to one drink per day.    No smoking.     Wear sunscreen to prevent skin cancer.    See your dentist every six months for an exam and cleaning.      Diet and exercise are important!    - Improving your diet and increasing exercise has been shown to improve your overall health.  It is THE BEST preventive health measure.  It is also beneficial to mental health.   - This includes lowering hemoglobin A1C (your diabetes lab test), lowering your blood pressure, and lowering your cholesterol.  Losing weight often happens with a change in diet and exercise, but even if your weight stays the same - you are still getting benefits from diet and exercise!   - I recommend starting slow - like making a small change in diet or increasing exercise by one time a week to start.    - Then follow up closely to check in.   - A  or dietician are both great resources if that is available to you.     You are due for a mammogram, which is a study to screen for breast cancer.  [facility] several [facility] and also a walk-in mammogram service (no appointment needed!) at our [facility] location.  Below is the contact information.  Please complete the mammogram at your earliest convenience.  If you have any questions, call us at [facility] - [phone].    [facility]  [address]  [address]  [place] [address]  Appointments: [phone] or [phone]  Hours: M-F 7:00AM-5:00PM              Follow-ups after your visit        Additional Services     OPTOMETRY REFERRAL       Your provider has referred you to: [facility] [phone]   [email or web address]    Please be aware that coverage of these services is  subject to the terms and limitations of your health insurance plan.  Call member services at your health plan with any benefit or coverage questions.      Please bring the following with you to your appointment:    (1) Any X-Rays, CTs or MRIs which have been performed.  Contact the facility where they were done to arrange for  prior to your scheduled appointment.    (2) List of current medications  (3) This referral request   (4) Any documents/labs given to you for this referral                  Follow-up notes from your care team     Return in about 1 year (around 11/8/2019) for Physical with Pap (Preventive Care).      Future tests that were ordered for you today     Open Future Orders        Priority Expected Expires Ordered    MA Screening Digital Bilateral Routine  11/8/2019 11/8/2018            Who to contact     If you have questions or need follow up information about today's clinic visit or your schedule please contact [facility] directly at [phone].  Normal or non-critical lab and imaging results will be communicated to you by ENEFprohart, letter or phone within 4 business days after the clinic has received the results. If you do not hear from us within 7 days, please contact the clinic through ENEFprohart or phone. If you have a critical or abnormal lab result, we will notify you by phone as soon as possible.  Submit refill requests through [employer]otemed[employer] or call your pharmacy and they will forward the refill request to us. Please allow 3 business days for your refill to be completed.          Additional Information About Your Visit        Remotemedical Information     [employer] gives you secure access to your electronic health record. If you see a primary care provider, you can also send messages to your care team and make appointments. If you have questions, please call your primary care clinic.  If you do not have a primary care provider, please call [phone] and they will assist  "you.        Care EveryWhere ID     This is your Care EveryWhere ID. This could be used by other organizations to access your [facility] medical records  [id number]        Your Vitals Were     Pulse Temperature Respirations Height Last Period Pulse Oximetry    94 97.8  F (36.6  C) (Tympanic) 16 5' 11.25\" (1.81 m) 10/29/2018 100%    BMI (Body Mass Index)                   34.62 kg/m2            Blood Pressure from Last 3 Encounters:   11/08/18 124/78   01/17/17 112/80   11/23/16 126/87    Weight from Last 3 Encounters:   11/08/18 250 lb (113.4 kg)   01/17/17 245 lb (111.1 kg)   11/23/16 246 lb 4.8 oz (111.7 kg)              We Performed the Following     CBC with platelets differential     Comprehensive metabolic panel     DEPRESSION ACTION PLAN (DAP)     EKG 12-lead complete w/read - Clinics     Hemoglobin A1c     Lipid panel reflex to direct LDL Fasting     OPTOMETRY REFERRAL        Primary Care Provider Office Phone # Fax #    [facility] [phone]       [address] [facility] [phone]        Equal Access to Services     GISELLA TAYLOR : Hadii aad ku hadasho Soomaali, waaxda luqadaha, qaybta kaalmada adeegyada, waxay vicentein haydavidn alea gu ah. So [facility] [phone].    ATENCIÓN: Si habla español, tiene a us disposición servicios gratuitos de asistencia lingüística. Llame al [phone].    We comply with applicable federal civil rights laws and [place] laws. We do not discriminate on the basis of race, color, national origin, age, disability, sex, sexual orientation, or gender identity.            Thank you!     Thank you for choosing [facility]  for your care. Our goal is always to provide you with excellent care. Hearing back from our patients is one way we can continue to improve our services. Please take a few minutes to complete the written survey that you may receive in the mail after your visit with us. Thank " you!             Your Updated Medication List - Protect others around you: Learn how to safely use, store and throw away your medicines at www.disposemymeds.org.          This list is accurate as of 11/8/18 10:29 AM.  Always use your most recent med list.                   Brand Name Dispense Instructions for use Diagnosis    * buPROPion 150 MG 24 hr tablet    WELLBUTRIN XL    60 tablet    Take 2 tablets (300 mg) by mouth every morning    Bipolar I disorder, single manic episode (H), Major depressive disorder, recurrent episode, moderate (H)       * buPROPion 300 MG 24 hr tablet    WELLBUTRIN XL     Take 300 mg by mouth daily        desvenlafaxine succinate 100 MG 24 hr tablet    PRISTIQ     Take 1 tablet by mouth every morning        venlafaxine 225 MG Tb24 24 hr tablet    EFFEXOR-ER    30 tablet    TAKE 1 TABLET(225 MG) BY MOUTH DAILY WITH BREAKFAST    Bipolar I disorder, single manic episode (H), Major depressive disorder, recurrent episode, moderate (H)       ziprasidone 40 MG capsule    GEODON    30 capsule    TAKE 1 CAPSULE(40 MG) BY MOUTH DAILY    Bipolar I disorder, single manic episode (H), Major depressive disorder, recurrent episode, moderate (H)       * Notice:  This list has 2 medication(s) that are the same as other medications prescribed for you. Read the directions carefully, and ask your doctor or other care provider to review them with you.       family member